# Patient Record
Sex: FEMALE | Race: WHITE | NOT HISPANIC OR LATINO | Employment: FULL TIME | URBAN - METROPOLITAN AREA
[De-identification: names, ages, dates, MRNs, and addresses within clinical notes are randomized per-mention and may not be internally consistent; named-entity substitution may affect disease eponyms.]

---

## 2023-03-14 LAB — LIPOPROTEIN A SER/PLAS: 54 MG/DL

## 2023-03-16 ENCOUNTER — TELEPHONE (OUTPATIENT)
Dept: PRIMARY CARE | Facility: CLINIC | Age: 63
End: 2023-03-16
Payer: COMMERCIAL

## 2023-08-10 ENCOUNTER — TELEPHONE (OUTPATIENT)
Dept: PRIMARY CARE | Facility: CLINIC | Age: 63
End: 2023-08-10
Payer: COMMERCIAL

## 2023-08-26 ENCOUNTER — HOSPITAL ENCOUNTER (OUTPATIENT)
Dept: DATA CONVERSION | Facility: HOSPITAL | Age: 63
Discharge: HOME | End: 2023-08-26

## 2023-08-26 DIAGNOSIS — S82.832A OTHER FRACTURE OF UPPER AND LOWER END OF LEFT FIBULA, INITIAL ENCOUNTER FOR CLOSED FRACTURE: ICD-10-CM

## 2023-08-26 DIAGNOSIS — S99.912A UNSPECIFIED INJURY OF LEFT ANKLE, INITIAL ENCOUNTER: ICD-10-CM
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
## 2023-08-26 DIAGNOSIS — S93.402A SPRAIN OF UNSPECIFIED LIGAMENT OF LEFT ANKLE, INITIAL ENCOUNTER: ICD-10-CM

## 2023-08-26 DIAGNOSIS — S99.922A UNSPECIFIED INJURY OF LEFT FOOT, INITIAL ENCOUNTER: ICD-10-CM

## 2023-10-09 PROBLEM — N60.09 BREAST CYST: Status: ACTIVE | Noted: 2023-10-09

## 2023-10-09 PROBLEM — E66.3 OVERWEIGHT (BMI 25.0-29.9): Status: ACTIVE | Noted: 2023-10-09

## 2023-10-09 PROBLEM — G93.5 CHIARI MALFORMATION TYPE I (MULTI): Status: ACTIVE | Noted: 2023-10-09

## 2023-10-09 PROBLEM — M65.319 ACQUIRED TRIGGER THUMB: Status: ACTIVE | Noted: 2023-10-09

## 2023-10-09 PROBLEM — E55.9 VITAMIN D DEFICIENCY: Status: ACTIVE | Noted: 2023-10-09

## 2023-10-09 PROBLEM — K58.1 IRRITABLE BOWEL SYNDROME WITH CONSTIPATION: Status: ACTIVE | Noted: 2023-10-09

## 2023-10-09 PROBLEM — N95.8 OTHER SPECIFIED MENOPAUSAL AND PERIMENOPAUSAL DISORDERS: Status: ACTIVE | Noted: 2023-10-09

## 2023-10-09 PROBLEM — N95.1 MENOPAUSAL SYMPTOMS: Status: ACTIVE | Noted: 2023-10-09

## 2023-10-09 PROBLEM — B36.9 OTOMYCOSIS OF RIGHT EAR: Status: ACTIVE | Noted: 2023-10-09

## 2023-10-09 PROBLEM — M25.572 ANKLE PAIN, LEFT: Status: ACTIVE | Noted: 2023-10-09

## 2023-10-09 PROBLEM — M16.11 PRIMARY OSTEOARTHRITIS OF RIGHT HIP: Status: ACTIVE | Noted: 2023-10-09

## 2023-10-09 PROBLEM — M26.69 TMJ CREPITUS: Status: ACTIVE | Noted: 2023-10-09

## 2023-10-09 PROBLEM — S82.839A FRACTURE OF FIBULA, DISTAL: Status: ACTIVE | Noted: 2023-10-09

## 2023-10-09 PROBLEM — H62.41 OTOMYCOSIS OF RIGHT EAR: Status: ACTIVE | Noted: 2023-10-09

## 2023-10-09 PROBLEM — H92.01 OTALGIA, RIGHT: Status: ACTIVE | Noted: 2023-10-09

## 2023-10-09 PROBLEM — H60.541 ECZEMA OF RIGHT EXTERNAL EAR: Status: ACTIVE | Noted: 2023-10-09

## 2023-10-09 PROBLEM — D72.819 LEUKOPENIA: Status: ACTIVE | Noted: 2023-10-09

## 2023-10-09 PROBLEM — M25.50 ARTHRALGIA: Status: ACTIVE | Noted: 2023-10-09

## 2023-10-09 PROBLEM — F41.9 ANXIETY: Status: ACTIVE | Noted: 2023-10-09

## 2023-10-09 PROBLEM — H93.8X1 FULLNESS IN EAR, RIGHT: Status: ACTIVE | Noted: 2023-10-09

## 2023-10-09 PROBLEM — E78.5 HYPERLIPIDEMIA: Status: ACTIVE | Noted: 2023-10-09

## 2023-10-09 RX ORDER — AMOXICILLIN 500 MG/1
2000 TABLET, FILM COATED ORAL
COMMUNITY
Start: 2023-05-09

## 2023-10-09 RX ORDER — ATORVASTATIN CALCIUM 10 MG/1
10 TABLET, FILM COATED ORAL NIGHTLY
COMMUNITY
End: 2024-02-28 | Stop reason: ALTCHOICE

## 2023-10-09 RX ORDER — CLOTRIMAZOLE 1 G/ML
SOLUTION TOPICAL 2 TIMES DAILY
COMMUNITY
Start: 2021-10-29

## 2023-10-09 RX ORDER — TRETINOIN 1 MG/G
CREAM TOPICAL NIGHTLY
COMMUNITY
Start: 2023-07-11 | End: 2024-02-28 | Stop reason: ALTCHOICE

## 2023-10-09 RX ORDER — SERTRALINE HYDROCHLORIDE 100 MG/1
1 TABLET, FILM COATED ORAL DAILY
COMMUNITY
Start: 2016-10-14 | End: 2023-12-26 | Stop reason: SDUPTHER

## 2023-10-09 RX ORDER — OXYCODONE AND ACETAMINOPHEN 5; 325 MG/1; MG/1
1 TABLET ORAL EVERY 4 HOURS PRN
COMMUNITY
Start: 2023-03-21 | End: 2024-02-28 | Stop reason: ALTCHOICE

## 2023-10-09 RX ORDER — PLECANATIDE 3 MG/1
3 TABLET ORAL DAILY
COMMUNITY
Start: 2023-03-06 | End: 2024-02-28 | Stop reason: ALTCHOICE

## 2023-10-09 RX ORDER — SERTRALINE HYDROCHLORIDE 25 MG/1
1 TABLET, FILM COATED ORAL DAILY
COMMUNITY
End: 2024-02-28 | Stop reason: ALTCHOICE

## 2023-10-09 RX ORDER — MOMETASONE FUROATE 1 MG/G
CREAM TOPICAL DAILY PRN
COMMUNITY
Start: 2022-12-05

## 2023-10-09 RX ORDER — VALACYCLOVIR HYDROCHLORIDE 500 MG/1
500 TABLET, FILM COATED ORAL 2 TIMES DAILY
COMMUNITY
Start: 2023-03-05 | End: 2024-02-28 | Stop reason: ALTCHOICE

## 2023-10-09 RX ORDER — FLUOXETINE 20 MG/1
1 TABLET ORAL DAILY
COMMUNITY
End: 2024-02-28 | Stop reason: ALTCHOICE

## 2023-10-11 ENCOUNTER — OFFICE VISIT (OUTPATIENT)
Dept: DERMATOLOGY | Facility: CLINIC | Age: 63
End: 2023-10-11
Payer: COMMERCIAL

## 2023-10-11 DIAGNOSIS — L57.8 DIFFUSE PHOTODAMAGE OF SKIN: ICD-10-CM

## 2023-10-11 DIAGNOSIS — L81.4 LENTIGO: ICD-10-CM

## 2023-10-11 DIAGNOSIS — L82.1 SEBORRHEIC KERATOSIS: ICD-10-CM

## 2023-10-11 DIAGNOSIS — L57.0 ACTINIC KERATOSIS: Primary | ICD-10-CM

## 2023-10-11 DIAGNOSIS — L71.9 ROSACEA: ICD-10-CM

## 2023-10-11 PROCEDURE — 17004 DESTROY PREMAL LESIONS 15/>: CPT | Performed by: DERMATOLOGY

## 2023-10-11 PROCEDURE — 1036F TOBACCO NON-USER: CPT | Performed by: DERMATOLOGY

## 2023-10-11 PROCEDURE — 99214 OFFICE O/P EST MOD 30 MIN: CPT | Performed by: DERMATOLOGY

## 2023-10-11 RX ORDER — MINOCYCLINE HYDROCHLORIDE 100 MG/1
100 CAPSULE ORAL DAILY
Qty: 30 CAPSULE | Refills: 2 | Status: SHIPPED | OUTPATIENT
Start: 2023-10-11 | End: 2024-01-09

## 2023-10-11 NOTE — PROGRESS NOTES
Spoke with mom, states pt has no other symptoms but may be teething.  Pt is happy and playful, eating, drinking and sleeping well.  Advised mom to monitor and give tylenol prn.  I will call again tomorrow to see how she is doing.  If symptoms worsen mom should call back.  Mom states understanding and agrees.   Subjective     Chiquis Howard is a 62 y.o. female who presents for the following: OTHER (Discuss recent biopsy result and treatment options.).  Biopsy of a suspicious lesion on her right lateral distal arm performed at her last visit in our office on 8/21/2023 revealed an actinic keratosis.    Today, she reports a sensation of grittiness or sand in her eyes over the past few weeks.    Review of Systems:  No other skin or systemic complaints other than what is documented elsewhere in the note.    The following portions of the chart were reviewed this encounter and updated as appropriate:       Skin Cancer History  - history of AKs  - rosacea  - no h/o skin cancer    Specialty Problems          Dermatology Problems    Otomycosis of right ear     Allergies:  Iodinated contrast media    Current Medications / CAM's:    Current Outpatient Medications:     amoxicillin (Amoxil) 500 mg tablet, Take 4 tablets (2,000 mg) by mouth. ONE HOUR PRIOR TO DENTAL PROCEDURE, Disp: , Rfl:     atorvastatin (Lipitor) 10 mg tablet, Take 1 tablet (10 mg) by mouth once daily at bedtime., Disp: , Rfl:     clotrimazole (Lotrimin) 1 % external solution, Apply topically 2 times a day., Disp: , Rfl:     FLUoxetine (PROzac) 20 mg tablet, Take 1 tablet (20 mg) by mouth once daily., Disp: , Rfl:     minocycline 100 mg capsule, Take 1 capsule (100 mg) by mouth once daily., Disp: 30 capsule, Rfl: 2    mometasone (Elocon) 0.1 % cream, Apply topically once daily as needed., Disp: , Rfl:     oxyCODONE-acetaminophen (Percocet) 5-325 mg tablet, Take 1 tablet by mouth every 4 hours if needed., Disp: , Rfl:     sertraline (Zoloft) 100 mg tablet, Take 1 tablet (100 mg) by mouth once daily., Disp: , Rfl:     sertraline (Zoloft) 25 mg tablet, Take 1 tablet (25 mg) by mouth once daily., Disp: , Rfl:     SITagliptin phosphate (Januvia) 25 mg tablet, Take 1 tablet (25 mg) by mouth once daily., Disp: , Rfl:     tretinoin (Retin-A) 0.1 % cream, once daily at  bedtime., Disp: , Rfl:     Trulance tablet tablet, Take 1 tablet (3 mg) by mouth once daily., Disp: , Rfl:     valACYclovir (Valtrex) 500 mg tablet, Take 1 tablet (500 mg) by mouth 2 times a day. DAY BEGIN THE DAY BEFORE SURGERY, Disp: , Rfl:      Objective   Well appearing patient in no apparent distress; mood and affect are within normal limits.    A skin examination was performed including: Face, neck, and bilateral upper extremities. All findings within normal limits unless otherwise noted below.    Assessment/Plan   1. Actinic keratosis (17)  Right Upper Arm - Anterior (17)  On the patient's right lateral distal arm, there is a pink, well-healed scar at the recent biopsy site with a surrounding rim of erythema, grittiness, and scale; scattered on the patient's bilateral arms and dorsal forearms, there are multiple erythematous, gritty, scaly macules    Biopsy-proven Actinic Keratosis and additional AKs -right lateral distal arm, present on the deep and peripheral margins, and scattered on bilateral arms and dorsal forearms, respectively.  The pre-cancerous nature of these lesions and treatment options were discussed with the patient today.  At this time, I recommend treatment with liquid nitrogen cryotherapy.  The patient expressed understanding, is in agreement with this plan, and wishes to proceed with cryotherapy today.    Destr of lesion - Right Upper Arm - Anterior  Complexity: simple    Destruction method: cryotherapy    Informed consent: discussed and consent obtained    Lesion destroyed using liquid nitrogen: Yes    Cryotherapy cycles:  1  Outcome: patient tolerated procedure well with no complications    Post-procedure details: wound care instructions given      2. Rosacea  Head - Anterior (Face)  There is moderate erythema of her bilateral lower eyelid margins consistent with marginal blepharitis on exam today.  On the patient's face, most prominent over the bilateral medial cheeks and nose, there is  moderate underlying erythema with several overlying telangiectasias and a few scattered erythematous, inflammatory papules     Rosacea - ocular type.  The chronic and intermittently flaring nature of this condition and treatment options were discussed extensively with the patient today.  After discussing the risks and benefits of various treatment options, particularly topical therapy and oral antibiotics, the patient expressed understanding, and, given the evidence of ocular involvement on exam today, the patient wishes to begin a course of oral antibiotic therapy.  Thus, I recommend a 3-month course of oral antibiotic therapy with Minocycline 100 mg PO once daily for its anti-inflammatory purposes.  I also discussed the various triggers of rosacea with the patient today, especially sun exposure, and emphasized the importance of trigger avoidance, particularly sun avoidance and sun protection with daily sunscreen use.  The risks, benefits, and side effects of this medication, including possible dizziness or headaches, were discussed; the patient was instructed to take the oral antibiotic with food and a glass of water.  I will have the patient return to my office in 3 months for re-evaluation, at which time we will hopefully transition to topical maintenance therapy.  The patient expressed understanding and is in agreement with this plan.    minocycline 100 mg capsule - Head - Anterior (Face)  Take 1 capsule (100 mg) by mouth once daily.    3. Seborrheic keratosis  Scattered on the patient's face, neck, and bilateral upper extremities, there are multiple tan- to light brown-colored, hyperkeratotic, stuck-on appearing papules of varying size and shape    Seborrheic Keratoses - scattered on face, neck, and bilateral upper extremities.  The benign nature of these lesions was discussed with the patient today and reassurance provided.  No treatment is medically indicated for these lesions at this time.    4.  Lentigo  Photodistributed  Multiple tan- to light brown-colored, round- to oval-shaped, symmetric and uniform-appearing macules and small patches consistent with lentigines scattered in sun-exposed areas.    Solar Lentigines and photodamage.  The clinically benign-appearing nature of these lesions and their relation to chronic sun exposure were discussed with the patient today and reassurance provided.  None of these lesions meet threshold for biopsy today, and thus no treatment is medically indicated for these lesions at this time.  The signs and symptoms of skin cancer were reviewed and the patient was advised to practice sun protection and sun avoidance, use daily sunscreen, and perform regular self skin exams.  The patient was instructed to monitor these lesions for any changes, such as in size, shape, or color, or associated symptoms and to call our office to schedule a return visit for re-evaluation if any such changes or symptoms are noticed in the future.  The patient expressed understanding and is in agreement with this plan.    5. Diffuse photodamage of skin  Photodistributed  Diffuse photodamage with actinic changes with telangiectasia and mottled pigmentation in sun-exposed areas.    Photodamage.  The signs and symptoms of skin cancer were reviewed and the patient was advised to practice sun protection and sun avoidance, use daily sunscreen, and perform regular self skin exams.  Sun protection was discussed, including avoiding the mid-day sun, wearing a sunscreen with SPF at least 50, and stressing the need for reapplication of sunscreen and applying more than they think they need.

## 2023-10-12 ENCOUNTER — APPOINTMENT (OUTPATIENT)
Dept: DERMATOLOGY | Facility: CLINIC | Age: 63
End: 2023-10-12
Payer: COMMERCIAL

## 2023-10-18 ENCOUNTER — APPOINTMENT (OUTPATIENT)
Dept: ORTHOPEDIC SURGERY | Facility: CLINIC | Age: 63
End: 2023-10-18
Payer: COMMERCIAL

## 2023-10-18 ENCOUNTER — ANCILLARY PROCEDURE (OUTPATIENT)
Dept: RADIOLOGY | Facility: CLINIC | Age: 63
End: 2023-10-18
Payer: COMMERCIAL

## 2023-10-18 ENCOUNTER — OFFICE VISIT (OUTPATIENT)
Dept: ORTHOPEDIC SURGERY | Facility: CLINIC | Age: 63
End: 2023-10-18
Payer: COMMERCIAL

## 2023-10-18 DIAGNOSIS — S82.839A NONDISPLACED FRACTURE OF DISTAL END OF FIBULA: ICD-10-CM

## 2023-10-18 PROCEDURE — 99213 OFFICE O/P EST LOW 20 MIN: CPT | Performed by: PHYSICIAN ASSISTANT

## 2023-10-18 PROCEDURE — 1036F TOBACCO NON-USER: CPT | Performed by: PHYSICIAN ASSISTANT

## 2023-10-18 PROCEDURE — 73610 X-RAY EXAM OF ANKLE: CPT | Mod: LEFT SIDE | Performed by: RADIOLOGY

## 2023-10-18 PROCEDURE — 73610 X-RAY EXAM OF ANKLE: CPT | Mod: LT,FY

## 2023-10-18 NOTE — PROGRESS NOTES
Chief Complaint    left ankle fracture      History of Present [id number] female presents to ortho injury clinic with left fibula fracture. Patient was on a walking tour in [place] when another person fell into her and caused her to fall & invert her ankle. She experienced immediate pain. She was evaluated at a hospital in [facility] and given a soft ankle brace with instruction that she was okay for full weight bearing, per patient. She just returned from her trip this morning 8/7/23 and decided to come to the injury clinic for a 2nd opinion regarding best treatment.    Reports mild pain with weight bearing. Denies numbness/tingling in the extremity. Denies weakness. Has been ambulating a lot on it with the soft brace and using crutches prn.    On 8/23/23; patient reports she is doing well. WBAT in the boot. No pain    On 9/15/23; patient reports she is doing well. She did go to [facility] and was doing a lot of walking. She did take off the boot and walk at home. She has been getting vitamin D. No pain    On 9/29/23; patient reports she is doing well. WBAT in her boot. No pain. Doing some HEP.     On 10/18/23; patient reports she is doing well. WBAT in tennis shoes. No pain. Doing HEP.      On examination of the left ankle/foot:  Normal gait in the boot  Normal alignment  Minimal swelling; No ecchymosis or erythema. Skin intact; no ulcers or lesions.   Normal ROM in ankle plantarflexion, dorsiflexion, inversion and eversion; normal ROM in 2-5th toe flexion/extension and 1st MTP flexion/extension  Normal strength in ankle plantarflexion, dorsiflexion, inversion and eversion; normal strength with great toe flexion/extension  Tenderness to palpation: none lateral malleolus  No tenderness to palpation over the Achilles, medial malleolus, posterior tibial tendon, peroneal tendon, ATFL, deltoid ligament, talus or navicular.  no tenderness to palpation over heel, plantar arch, base of 1st metatarsal/2nd metatarsal,  sesamoids, 5th metatarsal, medial cuneiform, navicular, 1st MTP joint or 2nd or 3rd intermetarsal space.  Neurovascularly intact. Good capillary refill. No sensory deficit to light touch. Normal proprioception. Dorsalis pedis and posterior tibial pulses 2+ bilaterally.   - Moreno’s test  - Dorsiflexion-eversion test    I personally reviewed the patient's x-ray images and reports of the left ankle. The xrays show a minimally displaced fracture of the lateral malleolus with increased callus formation. no other fractures or dislocation. Normal joint spacing    ASSESSMENT: left ankle distal fibula Wu A fracture, healing    PLAN: Treatment options were discussed with the patient. The patient was given a prescription for physical therapy. Physical therapy is medically necessary to improve strength, balance, range of motion and functional outcomes after injury and/or surgery. Patient was given a handout and instructed on an at home stretching program. They should do these exercises 3 times per day for 6 weeks and then daily. Patient can use OTC aspercream for pain and continue to ice and elevate supported at the calf to reduce swelling. All the patient's questions were answered. The patient agrees with the above plan. Follow up in 4-6 weeks with repeat left ankle xrays with AP, lateral and oblique views to evaluate bone healing.

## 2023-10-19 ENCOUNTER — APPOINTMENT (OUTPATIENT)
Dept: ORTHOPEDIC SURGERY | Facility: CLINIC | Age: 63
End: 2023-10-19
Payer: COMMERCIAL

## 2023-12-07 ENCOUNTER — APPOINTMENT (OUTPATIENT)
Dept: OTOLARYNGOLOGY | Facility: CLINIC | Age: 63
End: 2023-12-07
Payer: COMMERCIAL

## 2023-12-14 ENCOUNTER — OFFICE VISIT (OUTPATIENT)
Dept: OTOLARYNGOLOGY | Facility: CLINIC | Age: 63
End: 2023-12-14
Payer: COMMERCIAL

## 2023-12-14 VITALS — BODY MASS INDEX: 29.51 KG/M2 | WEIGHT: 188 LBS | TEMPERATURE: 96.9 F | HEIGHT: 67 IN

## 2023-12-14 DIAGNOSIS — H61.21 IMPACTED CERUMEN OF RIGHT EAR: Primary | ICD-10-CM

## 2023-12-14 DIAGNOSIS — L29.9 ITCHING OF EAR: ICD-10-CM

## 2023-12-14 PROCEDURE — 1036F TOBACCO NON-USER: CPT | Performed by: NURSE PRACTITIONER

## 2023-12-14 PROCEDURE — 99213 OFFICE O/P EST LOW 20 MIN: CPT | Performed by: NURSE PRACTITIONER

## 2023-12-14 RX ORDER — METRONIDAZOLE 7.5 MG/G
CREAM TOPICAL
COMMUNITY
Start: 2023-08-21

## 2023-12-14 ASSESSMENT — PATIENT HEALTH QUESTIONNAIRE - PHQ9
SUM OF ALL RESPONSES TO PHQ9 QUESTIONS 1 AND 2: 0
2. FEELING DOWN, DEPRESSED OR HOPELESS: NOT AT ALL
1. LITTLE INTEREST OR PLEASURE IN DOING THINGS: NOT AT ALL

## 2023-12-14 NOTE — PROGRESS NOTES
Subjective   Patient ID: Chiquis Howard is a 62 y.o. female who presents for Cerumen Impaction and Follow-up.  HPI  Patient presents for 6-month follow-up visit.  She has a history of right otomycosis and right eczema.  She also had radiation on that side.  Today, she reports intermittent significant itching to the right EAC.  She uses oil-based drops intermittently.  Today, she denies any otalgia, otorrhea, hearing loss.  Review of Systems  A comprehensive or 10 points review of the patient´s constitutional, neurological, HEENT, pulmonary, cardiovascular and genito-urinary systems showed only those mentioned in history of present illness.    Objective   Physical Exam  Constitutional: no fever, chills, weight loss or weight gain   General appearance: Appears well, well-nourished, well groomed. No acute distress.   Communication: Normal communication   Psychiatric: Oriented to person, place and time. Normal mood and affect.   Neurologic: Cranial nerves II-XII grossly intact and symmetric bilaterally.   Head and Face:   Head: Atraumatic with no masses, lesions or scarring.   Face: Normal symmetry, no paralysis, synkinesis or facial tic. No scars or deformities.     Eyes: Conjunctiva not edematous or erythematous   Ears: External inspection of ears with no deformity, scars or masses.  Right canal with cerumen impaction.  Left canal clear.  TM intact.  No effusion or retraction noted.     Neck: Normal appearing, symmetric, trachea midline.   Cardiovascular: Examination of peripheral vascular system shows no clubbing or cyanosis.   Respiratory: No respiratory distress increased work of breathing. Inspection of the chest with symmetric chest expansion and normal respiratory effort.   Skin: No rashes in the head or neck    Assessment/Plan        This patient presents for subsequent evaluation of acute acquired right-sided cerumen impaction and chronic right EAC itching.    Reassurance given that exam looks good after  cleaning.  Skin of right ear canal is still slightly irritated, but much improved than when she was initially seeing me.  I recommended she continue using the oil-based drops and return in 6 months for cleaning.  All questions were answered to patient's satisfaction.    This note was created using speech recognition transcription software. Despite proofreading, several typographical errors might be present that might affect the meaning of the content. Please call with any questions.    Patient ID: Chiquis Howard is a 62 y.o. female.    Ear cerumen removal    Date/Time: 12/14/2023 2:25 PM    Performed by: DANYA Arana  Authorized by: DANYA Arana    Consent:     Consent obtained:  Verbal    Consent given by:  Patient    Risks discussed:  Pain    Alternatives discussed:  No treatment  Procedure details:     Location:  R ear    Procedure type comment:  Suction    Procedure outcomes: cerumen removed    Post-procedure details:     Inspection:  No bleeding, ear canal clear and TM intact    Hearing quality:  Normal    Procedure completion:  Tolerated well, no immediate complications  Comments:      Skin of right EAC is slightly erythematous and peeling.  No recurrence of otomycosis.      DANYA Arana 12/14/23 2:23 PM

## 2023-12-26 DIAGNOSIS — F41.9 ANXIETY: Primary | ICD-10-CM

## 2023-12-26 RX ORDER — SERTRALINE HYDROCHLORIDE 100 MG/1
100 TABLET, FILM COATED ORAL DAILY
Qty: 30 TABLET | Refills: 2 | Status: SHIPPED | OUTPATIENT
Start: 2023-12-26 | End: 2024-02-28 | Stop reason: SDUPTHER

## 2024-01-29 ENCOUNTER — OFFICE VISIT (OUTPATIENT)
Dept: DERMATOLOGY | Facility: CLINIC | Age: 64
End: 2024-01-29
Payer: COMMERCIAL

## 2024-01-29 DIAGNOSIS — L71.9 ROSACEA: Primary | ICD-10-CM

## 2024-01-29 DIAGNOSIS — L57.8 DIFFUSE PHOTODAMAGE OF SKIN: ICD-10-CM

## 2024-01-29 PROCEDURE — 1036F TOBACCO NON-USER: CPT | Performed by: DERMATOLOGY

## 2024-01-29 PROCEDURE — 99214 OFFICE O/P EST MOD 30 MIN: CPT | Performed by: DERMATOLOGY

## 2024-01-29 RX ORDER — DOXYCYCLINE 100 MG/1
100 CAPSULE ORAL DAILY
Qty: 30 CAPSULE | Refills: 2 | Status: SHIPPED | OUTPATIENT
Start: 2024-01-29 | End: 2024-04-28

## 2024-01-29 ASSESSMENT — DERMATOLOGY PATIENT ASSESSMENT
ARE YOU ON BIRTH CONTROL: NO
ARE YOU TRYING TO GET PREGNANT: NO
DO YOU HAVE ANY NEW OR CHANGING LESIONS: NO
DO YOU HAVE IRREGULAR MENSTRUAL CYCLES: NO
DO YOU USE SUNSCREEN: OCCASIONALLY
ARE YOU AN ORGAN TRANSPLANT RECIPIENT: NO
DO YOU USE A TANNING BED: NO

## 2024-01-29 ASSESSMENT — DERMATOLOGY QUALITY OF LIFE (QOL) ASSESSMENT: ARE THERE EXCLUSIONS OR EXCEPTIONS FOR THE QUALITY OF LIFE ASSESSMENT: NO

## 2024-01-29 ASSESSMENT — ITCH NUMERIC RATING SCALE: HOW SEVERE IS YOUR ITCHING?: 0

## 2024-01-29 NOTE — PROGRESS NOTES
Subjective     Chiquis Howard is a 63 y.o. female who presents for the following: Rosacea.  She was last seen in our office on 10/11/23, at which time she was prescribed a 3-month course of oral antibiotic therapy with Minocycline 100 mg PO once daily for ocular rosacea.    Today, the patient states she took Minocycline for 1 month, but she was experiencing GI upset and diarrhea, so she then took a 1 week break and then took it for another 3 weeks and then stopped because of diarrhea.  Today, the patient states her ocular symptoms improved with minocycline, but did not resolve, and she states she still feels like there is sand or glass in her eyes.  Her last dose was about 1.5 months ago.      Review of Systems:  No other skin or systemic complaints other than what is documented elsewhere in the note.    The following portions of the chart were reviewed this encounter and updated as appropriate:       Skin Cancer History  No skin cancer on file.    Specialty Problems          Dermatology Problems    Otomycosis of right ear       Past Dermatologic / Past Relevant Medical History:    - history of AKs  - rosacea, including ocular rosacea  - no h/o skin cancer    Family History:    No family history of melanoma or skin cancer    Social History:    The patient states she went to [facility] and then [facility] for her [employer] and now works in [employer] management for [employer] and has 3 sons ages 21, 23, and 24 years old     Allergies:  Iodinated contrast media    Current Medications / CAM's:    Current Outpatient Medications:     amoxicillin (Amoxil) 500 mg tablet, Take 4 tablets (2,000 mg) by mouth. ONE HOUR PRIOR TO DENTAL PROCEDURE, Disp: , Rfl:     atorvastatin (Lipitor) 10 mg tablet, Take 1 tablet (10 mg) by mouth once daily at bedtime., Disp: , Rfl:     clotrimazole (Lotrimin) 1 % external solution, Apply topically 2 times a day., Disp: , Rfl:     FLUoxetine (PROzac) 20 mg tablet,  Take 1 tablet (20 mg) by mouth once daily., Disp: , Rfl:     metroNIDAZOLE (Metrocream) 0.75 % cream, 1 APPLICATION TWICE A DAY TO AFFECTED AREA OF FACE, Disp: , Rfl:     mometasone (Elocon) 0.1 % cream, Apply topically once daily as needed., Disp: , Rfl:     oxyCODONE-acetaminophen (Percocet) 5-325 mg tablet, Take 1 tablet by mouth every 4 hours if needed., Disp: , Rfl:     sertraline (Zoloft) 100 mg tablet, Take 1 tablet (100 mg) by mouth once daily., Disp: 30 tablet, Rfl: 2    sertraline (Zoloft) 25 mg tablet, Take 1 tablet (25 mg) by mouth once daily., Disp: , Rfl:     SITagliptin phosphate (Januvia) 25 mg tablet, Take 1 tablet (25 mg) by mouth once daily., Disp: , Rfl:     tretinoin (Retin-A) 0.1 % cream, once daily at bedtime., Disp: , Rfl:     Trulance tablet tablet, Take 1 tablet (3 mg) by mouth once daily., Disp: , Rfl:     valACYclovir (Valtrex) 500 mg tablet, Take 1 tablet (500 mg) by mouth 2 times a day. DAY BEGIN THE DAY BEFORE SURGERY, Disp: , Rfl:     doxycycline (Vibramycin) 100 mg capsule, Take 1 capsule (100 mg) by mouth once daily. Take with at least 8 ounces (large glass) of water, do not lie down for 30 minutes after, Disp: 30 capsule, Rfl: 2     Objective   Well appearing patient in no apparent distress; mood and affect are within normal limits.    A skin examination was performed including the face and neck. All findings within normal limits unless otherwise noted below.    Assessment/Plan   1. Rosacea  Right Eye  There is mild-moderate erythema of her bilateral lower eyelid margins consistent with marginal blepharitis on exam today.  On the patient's face, most prominent over the bilateral medial cheeks and nose, there is moderate underlying erythema with several overlying telangiectasias and a few scattered erythematous, inflammatory papules.    Rosacea - recurrent flare; ocular type.  The chronic and intermittently flaring nature of this condition and treatment options were discussed  extensively with the patient again today.  After discussing the risks and benefits of various treatment options, particularly topical therapy and oral antibiotics, the patient expressed understanding, and, given the evidence of ocular involvement on exam today, she wishes to begin another course of oral antibiotic therapy.  Thus, given her GI side effects with Minocycline, we recommend switching her oral antibiotic therapy to a 3-month course of Doxycycline 100 mg PO once daily for its anti-inflammatory purposes.  We also discussed the various triggers of rosacea with the patient today, especially sun exposure, and emphasized the importance of trigger avoidance, particularly sun avoidance and sun protection with daily sunscreen use.  The risks, benefits, and side effects of this medication, including possible GI upset and photosensitivity, were discussed; the patient was instructed to take the oral antibiotic with food and a glass of water and to make sure to practice sun avoidance and sun protection with daily sunscreen use while on doxycycline.  We will have the patient return to our office in 3 months for re-evaluation, at which time we will hopefully transition to topical maintenance therapy.  Sheexpressed understanding and is in agreement with this plan.    Related Medications  doxycycline (Vibramycin) 100 mg capsule  Take 1 capsule (100 mg) by mouth once daily. Take with at least 8 ounces (large glass) of water, do not lie down for 30 minutes after    2. Diffuse photodamage of skin  Diffuse photodamage with actinic changes with telangiectasia and mottled pigmentation in sun-exposed areas.    Photodamage.  The signs and symptoms of skin cancer were reviewed and the patient was advised to practice sun protection and sun avoidance, use daily sunscreen, and perform regular self skin exams.  Sun protection was discussed, including avoiding the mid-day sun, wearing a sunscreen with SPF at least 50, and stressing the  need for reapplication of sunscreen and applying more than they think they need.         Elie Casillas, DO      I saw and evaluated the patient. I personally obtained the key and critical portions of the history and physical exam or was physically present for key and critical portions performed by the resident/fellow. I reviewed the resident/fellow's documentation and discussed the patient with the resident/fellow. I agree with the resident/fellow's medical decision making as documented in the note.    Skyler Sifuentes MD

## 2024-02-06 ENCOUNTER — OFFICE VISIT (OUTPATIENT)
Dept: OTOLARYNGOLOGY | Facility: CLINIC | Age: 64
End: 2024-02-06
Payer: COMMERCIAL

## 2024-02-06 ENCOUNTER — CLINICAL SUPPORT (OUTPATIENT)
Dept: AUDIOLOGY | Facility: CLINIC | Age: 64
End: 2024-02-06
Payer: COMMERCIAL

## 2024-02-06 VITALS — BODY MASS INDEX: 28.82 KG/M2 | TEMPERATURE: 96.4 F | WEIGHT: 183.6 LBS | HEIGHT: 67 IN

## 2024-02-06 DIAGNOSIS — H69.92 DYSFUNCTION OF LEFT EUSTACHIAN TUBE: ICD-10-CM

## 2024-02-06 DIAGNOSIS — H90.42 SENSORINEURAL HEARING LOSS (SNHL) OF LEFT EAR WITH UNRESTRICTED HEARING OF RIGHT EAR: ICD-10-CM

## 2024-02-06 DIAGNOSIS — H93.12 TINNITUS OF LEFT EAR: ICD-10-CM

## 2024-02-06 DIAGNOSIS — H90.42 SENSORINEURAL HEARING LOSS (SNHL) OF LEFT EAR WITH UNRESTRICTED HEARING OF RIGHT EAR: Primary | ICD-10-CM

## 2024-02-06 DIAGNOSIS — H61.23 BILATERAL IMPACTED CERUMEN: Primary | ICD-10-CM

## 2024-02-06 PROCEDURE — 69210 REMOVE IMPACTED EAR WAX UNI: CPT | Performed by: NURSE PRACTITIONER

## 2024-02-06 PROCEDURE — 92557 COMPREHENSIVE HEARING TEST: CPT | Performed by: AUDIOLOGIST

## 2024-02-06 PROCEDURE — 92550 TYMPANOMETRY & REFLEX THRESH: CPT | Performed by: AUDIOLOGIST

## 2024-02-06 PROCEDURE — 99214 OFFICE O/P EST MOD 30 MIN: CPT | Performed by: NURSE PRACTITIONER

## 2024-02-06 PROCEDURE — 1036F TOBACCO NON-USER: CPT | Performed by: NURSE PRACTITIONER

## 2024-02-06 RX ORDER — AZELASTINE 1 MG/ML
2 SPRAY, METERED NASAL 2 TIMES DAILY
Qty: 30 ML | Refills: 11 | Status: SHIPPED | OUTPATIENT
Start: 2024-02-06 | End: 2025-02-05

## 2024-02-06 RX ORDER — FLUTICASONE PROPIONATE 50 MCG
1 SPRAY, SUSPENSION (ML) NASAL 2 TIMES DAILY
Qty: 16 G | Refills: 11 | Status: SHIPPED | OUTPATIENT
Start: 2024-02-06 | End: 2025-02-05

## 2024-02-06 ASSESSMENT — PATIENT HEALTH QUESTIONNAIRE - PHQ9
SUM OF ALL RESPONSES TO PHQ9 QUESTIONS 1 AND 2: 0
1. LITTLE INTEREST OR PLEASURE IN DOING THINGS: NOT AT ALL
2. FEELING DOWN, DEPRESSED OR HOPELESS: NOT AT ALL

## 2024-02-06 NOTE — PROGRESS NOTES
Subjective   Patient ID: Chiquis Howard is a 63 y.o. female who presents for Ear Problem (Crackling in left ear).  HPI  This patient presents for a follow-up visit.  In review, she has seen me in the past for right otomycosis which resolved.  She reports recent positionally triggered vertigo which resolved with home Epley maneuver.  Her main complaint today is frequent crackling in the left ear.  This has been fairly constant for the past 3 weeks.  It is worse when lying on her left side.  She denies any otalgia, otorrhea but endorses decreased hearing on the left side.  She is unsure if the decreased hearing began before or after the crackling.  She endorses frequent nasal congestion and has used alternating Flonase and saline spray.  Review of Systems  A comprehensive or 10 points review of the patient´s constitutional, neurological, HEENT, pulmonary, cardiovascular and genito-urinary systems showed only those mentioned in history of present illness.    Objective   Physical Exam  Constitutional: no fever, chills, weight loss or weight gain   General appearance: Appears well, well-nourished, well groomed. No acute distress.   Communication: Normal communication   Psychiatric: Oriented to person, place and time. Normal mood and affect.   Neurologic: Cranial nerves II-XII grossly intact and symmetric bilaterally.   Head and Face:   Head: Atraumatic with no masses, lesions or scarring.   Face: Normal symmetry, no paralysis, synkinesis or facial tic. No scars or deformities.   TMJ: Bilateral crepitus  Eyes: Conjunctiva not edematous or erythematous   Ears: External inspection of ears with no deformity, scars or masses. Bilateral EACs with cerumen impactions.  Neck: Normal appearing, symmetric, trachea midline.   Cardiovascular: Examination of peripheral vascular system shows no clubbing or cyanosis.   Respiratory: No respiratory distress increased work of breathing. Inspection of the chest with symmetric chest expansion  and normal respiratory effort.   Skin: No rashes in the head or neck  My interpretation of the audiogram done today is right-sided with normal hearing, excellent word recognition score and normal tympanogram.  Left side with normal hearing through 750 Hz dipping to very mild sensorineural hearing loss with asymmetry at 1000 through 4000 Hz with excellent word recognition score and normal tympanogram.  She has not had any previous audiograms to compare this to.  Assessment/Plan        This patient presents for subsequent evaluation of acute acquired bilateral cerumen impaction,  left sensorineural hearing loss, and left eustachian tube dysfunction.    Reassurance given that otologic exam is normal under the microscope after cleaning.  Initially, she felt there was some relief of left crackling after cerumen removed, but within a few minutes it started again.  We discussed that her description of crackling is consistent with eustachian tube dysfunction, but I do not visualize fluid behind the left TM.  There could be fluid in her left mastoid.  I recommended Flonase and azelastine.  She was instructed on proper technique.  Given the sensorineural asymmetry, I also recommended MRI IAC with and without contrast to further evaluate.  I am happy to discuss results on the phone.  Since she is uncertain if the left hearing was decreased prior to the onset of crackling, I did not recommend high-dose prednisone.  Patient is in agreement with the plan.  All questions were answered to patient's satisfaction.      30 minutes was spent on this patient´s visit. More than 50% of that time was spent in counseling regarding the possible etiologies, test results, treatment options and coordinating care.    This note was created using speech recognition transcription software. Despite proofreading, several typographical errors might be present that might affect the meaning of the content. Please call with any questions.  Patient ID:  Chiquis Howard is a 63 y.o. female.    Ear cerumen removal    Date/Time: 2/6/2024 9:45 AM    Performed by: DANYA Arana  Authorized by: DANYA Arana    Consent:     Consent obtained:  Verbal    Consent given by:  Patient    Risks discussed:  Pain    Alternatives discussed:  No treatment  Procedure details:     Location:  L ear and R ear    Procedure type: curette      Procedure type comment:  Suction    Procedure outcomes: cerumen removed    Post-procedure details:     Inspection:  No bleeding, ear canal clear and TM intact    Hearing quality:  Normal    Procedure completion:  Tolerated well, no immediate complications  Comments:      On both sides, there was cerumen adherent to the TMs with stray hairs.  These were all easily removed.      DANYA Arana 02/06/24 9:41 AM

## 2024-02-06 NOTE — PROGRESS NOTES
AUDIOMETRIC EVALUATION    Name: Chiquis Howard  : 1960  Age: 63 y.o.    Date of Evaluation: 2024  Time: 8:30-9:00    HISTORY     Chiquis Howard is seen today for an audiometric evaluation in conjunction with ENT. Today, Ms. Howadr reports intermittent left-sided tinnitus, described as crackling. Symptoms began 3 weeks ago. Tinnitus is louder at night while laying on her left side. She reports her right ear is her better hearing ear. She reports difficulty understanding her children at home. She reports vertiginous/spinning dizziness beginning a few weeks ago. She reports recent medication change around the time of dizziness onset. She reports dizziness episodes are provoked by bending over and rolling over in bed. She reports a familial history of adult onset hearing loss of unknown etiology. Denies recent ear infections, otalgia, otorrhea, ear pressure, and a history of noise exposure.     IMPRESSIONS AND RECOMMENDATIONS      Discussed results and recommendations with Ms. Howard. Today's testing revealed normal hearing sensitivity in the right ear and normal hearing sloping to a mild sensorineural hearing loss, rising back to normal hearing in the left ear. Patient was recommended to follow up with ENT to pursue medical evaluation for their symptoms before seeking audiologic intervention for their hearing loss. It was recommended that another hearing test may be warranted following medical treatment. Encouraged Ms. Howard to use good communication strategies with her children, including face-to-face conversation, reducing background noise and distance from desired source, utilizing visual cues/gestures, increasing light to assist with visual cues, use of clear speech. Questions were addressed and the patient was encouraged to contact our department ([phone]) should questions or concerns arise.    TREATMENT PLAN     Follow up with ENT as scheduled.  Retest hearing in conjunction with medical care  or sooner if concerns arise.  Wear hearing protection while in the presence of loud sounds.  Follow up with medical providers as indicated.    PROCEDURE     OTOSCOPY - Physical exam to evaluate the outer ear.  Right Ear: Clear ear canal with visible tympanic membrane.  Left Ear: Clear ear canal with visible tympanic membrane.    IMMITTANCE AUDIOMETRY - Assesses the function of the middle and inner ear structures.  Right Ear: Tympanometry revealed normal ear canal volume, peak pressure, and compliance, consistent with normal middle ear function (Type A). Ipsilateral Acoustic Reflexes were present 500-1000 Hz.   Left Ear: Tympanometry revealed normal ear canal volume, peak pressure, and compliance, consistent with normal middle ear function (Type A). Ipsilateral Acoustic Reflexes were present at 500 Hz.     DISTORTION-PRODUCT OTOACOUSTIC EMISSIONS (DPOAEs) - Assesses cochlear outer hair cell function.  Right Ear: Present 4979-4786, 8000 Hz. Absent 5874-6614 Hz.  Left Ear: Present 3113-8718 Hz. Absent 2000, 1387-6620 Hz.    PURE TONE AUDIOMETRY - Conventional Audiometry via TDH headphones with good reliability.  Right Ear: Normal hearing sensitivity 125-8000 Hz.  Left Ear: Normal hearing sensitivity 125-500 Hz sloping to a mild sensorineural hearing loss 4628-3239 Hz, rising back to normal hearing sensitivity at 8000 Hz.    SPEECH AUDIOMETRY  Right Ear: Speech recognition threshold in agreement with pure tone average. Word Recognition Score was 100% at 65 dB HL, based on an NU-6 recorded ordered by difficulty 10-word list.   Left Ear: Speech recognition threshold in agreement with pure tone average. Word Recognition Score was 100% at 70 dB HL, based on an NU-6 recorded ordered by difficulty 10-word list.       Completed by:    FAREED Pedro.  Doctor of Audiology Extern     Gypsy Diaz, [facility]  Licensed Audiologist    AUDIOGRAM

## 2024-02-08 ENCOUNTER — TELEPHONE (OUTPATIENT)
Dept: PRIMARY CARE | Facility: CLINIC | Age: 64
End: 2024-02-08
Payer: COMMERCIAL

## 2024-02-08 DIAGNOSIS — Z00.00 ROUTINE GENERAL MEDICAL EXAMINATION AT A HEALTH CARE FACILITY: Primary | ICD-10-CM

## 2024-02-08 DIAGNOSIS — E55.9 VITAMIN D DEFICIENCY: ICD-10-CM

## 2024-02-19 ENCOUNTER — APPOINTMENT (OUTPATIENT)
Dept: DERMATOLOGY | Facility: CLINIC | Age: 64
End: 2024-02-19
Payer: COMMERCIAL

## 2024-02-21 ENCOUNTER — LAB (OUTPATIENT)
Dept: LAB | Facility: LAB | Age: 64
End: 2024-02-21
Payer: COMMERCIAL

## 2024-02-21 DIAGNOSIS — Z00.00 ROUTINE GENERAL MEDICAL EXAMINATION AT A HEALTH CARE FACILITY: ICD-10-CM

## 2024-02-21 DIAGNOSIS — E55.9 VITAMIN D DEFICIENCY: ICD-10-CM

## 2024-02-21 LAB
25(OH)D3 SERPL-MCNC: 80 NG/ML (ref 30–100)
ALBUMIN SERPL BCP-MCNC: 4.5 G/DL (ref 3.4–5)
ALP SERPL-CCNC: 64 U/L (ref 33–136)
ALT SERPL W P-5'-P-CCNC: 17 U/L (ref 7–45)
ANION GAP SERPL CALC-SCNC: 12 MMOL/L (ref 10–20)
AST SERPL W P-5'-P-CCNC: 15 U/L (ref 9–39)
BILIRUB SERPL-MCNC: 0.4 MG/DL (ref 0–1.2)
BUN SERPL-MCNC: 16 MG/DL (ref 6–23)
CALCIUM SERPL-MCNC: 9.9 MG/DL (ref 8.6–10.6)
CHLORIDE SERPL-SCNC: 105 MMOL/L (ref 98–107)
CHOLEST SERPL-MCNC: 262 MG/DL (ref 0–199)
CHOLESTEROL/HDL RATIO: 4.9
CO2 SERPL-SCNC: 30 MMOL/L (ref 21–32)
CREAT SERPL-MCNC: 0.72 MG/DL (ref 0.5–1.05)
EGFRCR SERPLBLD CKD-EPI 2021: >90 ML/MIN/1.73M*2
ERYTHROCYTE [DISTWIDTH] IN BLOOD BY AUTOMATED COUNT: 12.7 % (ref 11.5–14.5)
EST. AVERAGE GLUCOSE BLD GHB EST-MCNC: 103 MG/DL
GLUCOSE SERPL-MCNC: 89 MG/DL (ref 74–99)
HBA1C MFR BLD: 5.2 %
HCT VFR BLD AUTO: 45.3 % (ref 36–46)
HDLC SERPL-MCNC: 53.3 MG/DL
HGB BLD-MCNC: 14.7 G/DL (ref 12–16)
LDLC SERPL CALC-MCNC: 182 MG/DL
MCH RBC QN AUTO: 29.1 PG (ref 26–34)
MCHC RBC AUTO-ENTMCNC: 32.5 G/DL (ref 32–36)
MCV RBC AUTO: 90 FL (ref 80–100)
NON HDL CHOLESTEROL: 209 MG/DL (ref 0–149)
NRBC BLD-RTO: 0 /100 WBCS (ref 0–0)
PLATELET # BLD AUTO: 181 X10*3/UL (ref 150–450)
POTASSIUM SERPL-SCNC: 4.3 MMOL/L (ref 3.5–5.3)
PROT SERPL-MCNC: 7.1 G/DL (ref 6.4–8.2)
RBC # BLD AUTO: 5.06 X10*6/UL (ref 4–5.2)
SODIUM SERPL-SCNC: 143 MMOL/L (ref 136–145)
TRIGL SERPL-MCNC: 133 MG/DL (ref 0–149)
TSH SERPL-ACNC: 2.43 MIU/L (ref 0.44–3.98)
VIT B12 SERPL-MCNC: 579 PG/ML (ref 211–911)
VLDL: 27 MG/DL (ref 0–40)
WBC # BLD AUTO: 4.2 X10*3/UL (ref 4.4–11.3)

## 2024-02-21 PROCEDURE — 85027 COMPLETE CBC AUTOMATED: CPT

## 2024-02-21 PROCEDURE — 82306 VITAMIN D 25 HYDROXY: CPT

## 2024-02-21 PROCEDURE — 84443 ASSAY THYROID STIM HORMONE: CPT

## 2024-02-21 PROCEDURE — 80061 LIPID PANEL: CPT

## 2024-02-21 PROCEDURE — 80053 COMPREHEN METABOLIC PANEL: CPT

## 2024-02-21 PROCEDURE — 36415 COLL VENOUS BLD VENIPUNCTURE: CPT

## 2024-02-21 PROCEDURE — 82607 VITAMIN B-12: CPT

## 2024-02-21 PROCEDURE — 83036 HEMOGLOBIN GLYCOSYLATED A1C: CPT

## 2024-02-27 ENCOUNTER — HOSPITAL ENCOUNTER (OUTPATIENT)
Dept: RADIOLOGY | Facility: CLINIC | Age: 64
Discharge: HOME | End: 2024-02-27
Payer: COMMERCIAL

## 2024-02-27 DIAGNOSIS — H90.42 SENSORINEURAL HEARING LOSS (SNHL) OF LEFT EAR WITH UNRESTRICTED HEARING OF RIGHT EAR: ICD-10-CM

## 2024-02-27 PROCEDURE — 70553 MRI BRAIN STEM W/O & W/DYE: CPT | Performed by: RADIOLOGY

## 2024-02-27 PROCEDURE — 2550000001 HC RX 255 CONTRASTS: Performed by: NURSE PRACTITIONER

## 2024-02-27 PROCEDURE — 70553 MRI BRAIN STEM W/O & W/DYE: CPT

## 2024-02-27 PROCEDURE — A9575 INJ GADOTERATE MEGLUMI 0.1ML: HCPCS | Performed by: NURSE PRACTITIONER

## 2024-02-27 RX ORDER — GADOTERATE MEGLUMINE 376.9 MG/ML
17 INJECTION INTRAVENOUS
Status: COMPLETED | OUTPATIENT
Start: 2024-02-27 | End: 2024-02-27

## 2024-02-27 RX ADMIN — GADOTERATE MEGLUMINE 17 ML: 376.9 INJECTION INTRAVENOUS at 09:21

## 2024-02-27 NOTE — PROGRESS NOTES
"Subjective   Patient ID: Chiquis Howard is a 63 y.o. female who presents for Annual Exam.    Last Annual Physical: Feb 2023   Last Dental Visit: Up-to-date   Last Eye exam: Within the year   Hearing Concerns: Yes       HPI   The patient reports that she is taking Zoloft for her anxiety. She states that she often experiences mood changes and inquired about increasing the dose of Zoloft. Her blood work showed elevated LDL and mentions that she has been consuming a plant-based diet. She complains of hives and adds that her GI issues have improved since she started consuming less dairy. She is experience lower back and thigh pain.     Review of Systems  Constitutional: No fever or chills, No Night Sweats  Eyes: No Blurry Vision or Eye sight problems  ENT: No Nasal Discharge, Hoarseness, sore throat  Cardiovascular: no chest pain, no palpitations and no syncope.   Respiratory: no cough, no shortness of breath during exertion and no shortness of breath at rest.   Gastrointestinal: + abdominal pain, no nausea and no vomiting.   : + diarrhea and constipation. No vaginal discharge, burning with urination, no blood in urine or stools  Skin: + hives. No Skin rashes or Lesions  Neuro: No Headache, no dizziness or Numbness or tingling  Psych: + Anxiety. No depression or sleeping problems  Heme: No Easy bleeding or brusing.   MSK: + lower back pain, thigh pain    Objective   /80   Pulse 66   Ht 1.702 m (5' 7\")   Wt 81.6 kg (180 lb)   SpO2 96%   BMI 28.19 kg/m²     Physical Exam  Constitutional: Alert and in no acute distress. Well developed, well nourished.   Head and Face: Head and face: Normal.    Eyes: Normal external exam. Pupils were equal in size, round, reactive to light (PERRL) with normal accommodation and extraocular movements intact (EOMI).   Ears, Nose, Mouth, and Throat: External inspection of ears and nose: Normal.  Hearing: Normal.  Nasal mucosa, septum, and turbinates: Normal.  Lips, teeth, and gums: " Normal.  Oropharynx: Normal.   Neck: No neck mass was observed. Supple. Thyroid not enlarged and there were no palpable thyroid nodules.   Cardiovascular: Heart rate and rhythm were normal, normal S1 and S2. Pedal pulses: Normal. No peripheral edema.   Pulmonary: No respiratory distress. Clear bilateral breath sounds.   Abdomen: Soft nontender; no abdominal mass palpated. Normal bowel sounds. No organomegaly.   Musculoskeletal: No joint swelling seen, normal movements of all extremities. Range of motion: Normal.  Muscle strength/tone: Normal.    Skin: Normal skin color and pigmentation, normal skin turgor, and no rash.   Neurologic: Deep tendon reflexes were 2+ and symmetric.   Psychiatric: Judgment and insight: Intact. Mood and affect: Normal.  Lymphatic: No cervical lymphadenopathy. Palpation of lymph nodes in axillae: Normal.  Palpation of lymph nodes in groin: Normal.    Lab Results   Component Value Date    WBC 4.2 (L) 02/21/2024    HGB 14.7 02/21/2024    HCT 45.3 02/21/2024     02/21/2024    CHOL 262 (H) 02/21/2024    TRIG 133 02/21/2024    HDL 53.3 02/21/2024    ALT 17 02/21/2024    AST 15 02/21/2024     02/21/2024    K 4.3 02/21/2024     02/21/2024    CREATININE 0.72 02/21/2024    BUN 16 02/21/2024    CO2 30 02/21/2024    TSH 2.43 02/21/2024    HGBA1C 5.2 02/21/2024       MR IAC w and wo IV contrast  Narrative: Interpreted By:  Keren Capps and Muddasani Dheeraj   STUDY:  MR IAC W AND WO IV CONTRAST;  2/27/2024 9:19 am      INDICATION:  Signs/Symptoms:Left asymmetric sensorineural hearing loss.      COMPARISON:  MRI of the brain 05/06/2011.      ACCESSION NUMBER(S):  [id number]      ORDERING CLINICIAN:  YONAS NEGRON      TECHNIQUE:  T2, FLAIR, DWI, gradient echo T2 and T1 weighted images of the brain  and high-resolution T1 weighted and T2 weighted images through the  region of internal auditory canals were acquired. Post contrast T1  weighted images of the whole brain and  high-resolution axial and  coronal T1 weighted images through the internal auditory canals were  acquired after administration of 17 mL Dotarem gadolinium based  intravenous contrast.      FINDINGS:  CSF Spaces: Ventricles, sulci and basal cisterns are within normal  limits. Along the right planum sphenoidale, there is an avidly  enhancing extra-axial dural-based lesion measuring up to 0.4 cm with  similar extension along the right optic nerve, compatible with a  meningioma and similar to previous MRI from 2011. No abnormal  extra-axial collection      Parenchyma: No diffusion restriction to suggest acute infarction.  Minimal nonspecific foci of T2 and FLAIR hyperintense signal  throughout the supratentorial white matter may be within normal  limits for patient's age. Regions of increased T2 signal within the  bilateral basal ganglia are favored to represent prominent  perivascular spaces. A venous angioma in the right frontal operculum  is again incidentally noted. No abnormal parenchymal enhancement. No  mass effect or midline shift. The cerebellar tonsils extend  approximately 5 mm below the foramen magnum, unchanged from previous  and consistent with a Chiari 1 malformation. Major intracranial flow  voids are patent.      IAC region: There is no focus of abnormal enhancement or mass effect  in bilateral cerebellopontine angle cisterns. There is no focus of  abnormal enhancement within bilateral internal auditory canals.  Bilateral inner ear structures demonstrate expected signal and  postcontrast appearance. There is a vascular loop associated with the  right anterior inferior cerebellar artery which is in close proximity  to the right porous acoustic and cranial nerves as they enter the  right internal auditory canal.      Paranasal Sinuses and Mastoids: The visualized paranasal sinuses are  well-aerated. Right mastoid effusion. Left mastoid air cells are  clear.      Impression: Normal MRI of the bilateral  internal auditory canals. No evidence of  retrocochlear mass.      Unchanged extra-axial dural-based lesion along the right planum  sphenoidale, compatible with a meningioma.      Stable Chiari 1 malformation.      I personally reviewed the images/study and I agree with the findings  as stated by Dr. Platt. This study was interpreted at [facility], [place], [place].      MACRO:  None      Signed by: Keren Capps 2/27/2024 2:26 PM  Dictation workstation:   [id number]      Assessment/Plan   Diagnoses and all orders for this visit:  Annual physical exam  Other specified menopausal and perimenopausal disorders  -     XR DEXA bone density; Future  Visit for screening mammogram  -     BI mammo bilateral screening tomosynthesis; Future  Elevated lipoprotein(a)  -     atorvastatin (Lipitor) 10 mg tablet; Take 1 tablet (10 mg) by mouth once daily at bedtime.  -     Lipid Panel; Future  -     Lipoprotein a; Future  -     Comprehensive Metabolic Panel; Future  -     Follow Up In [facility] - PCP - Established; Future  Moderate recurrent major depression (CMS/HCC)  -     sertraline (Zoloft) 100 mg tablet; Take 1.5 tablets (150 mg) by mouth once daily.  -     [facility]erral to [facility]; Future  Anxiety  -     sertraline (Zoloft) 100 mg tablet; Take 1.5 tablets (150 mg) by mouth once daily.  -     Referral to [facility]; Future  Chronic midline low back pain without sciatica  -     X-ray scoliosis 2 View (NON EOS); Future  -     XR lumbar spine 6+ views including oblique flexion extension; Future        Dear Chiquis Howard     It was my pleasure to take care of you today in the office. Below are the things we discussed today:    1. 1. Immunizations: Yearly Flu shot is recommended.          a: COVID: Please get booster from the pharmacy          b: Tetanus: Up-to-date. Last done in 2017         c: Shingrix: The patient will come back for it      2. Blood Work: Reviewed  3. Seen your dentist twice a year  4. Yearly Eye exam is recommended    5. BMI: Overweight   6: Diet recommendations:   Eat Clean, Try to have as many home cooked meals as possible  Avoid processed foods which contain excess calories, sugar, and sodium.    7. Exercise recommendations:   150 minutes a week to maintain your weight     If you have to loose weight, you need a better diet and exercise plan.     8. Supplements recommended:  a - Calcium 600 mg up to twice a day to get a total of 1200 mg. Each 8 oz of milk or yogurt or 1 oz of cheese, 1 Banana, 1 serving of green Leafy vegetable has about 300 mg of Calcium, so you may subtract that amount. Calcium citrate is the only acceptable supplement to take if you take an acid suppressing medication like Prilosec; otherwise Calcium carbonate is acceptable too (It can cause Constipation).   b - Vitamin D - 2000 IU daily     9. Please get your Living will / Advance directive completed if you do not have one already. Please make sure our office has a copy of the latest one.     10. Colonoscopy: Uptodate. Last done in Aug 2017, repeat in Aug 2027  11. Mammogram: Uptodate. Ordered for April 2024   12. PAP: S/P hysterectomy. The patient will follow up with OBGYN   13. DEXA: Ordered   14: Skin Check: Please see Dermatology once a year for a Skin Check.     15. Rosacea: Please follow up with Dermatology.    16. Anxiety and depression: Not well- controlled. Increase Zoloft to 150 mg. Access clinic referral.     17. Hyperlipidemia: LDL is 182. Elevated lipoprotein A. The patient will start atorvastatin and let me know if she starts to experience any side effects. She will recheck numbers in 3 months.    18. Hives: Allergy immunology referral.    19. Chronic lower back pain: Lumbar spine x-ray ordered. Scoliosis x-ray ordered. Physical therapy referral.     Follow up in 3 months. Please get blood work done prior to your appointment.    Follow up in  one year for a Physical. Please call the office before your Physical to see if you need blood work completed prior to your physical.     Please call me if any questions arise from now until your next visit. I will call you after I am done seeing patients. A Doctor is always available by phone when the office is closed. Please feel free to call for help with any problem that you feel shouldn't wait until the office re-opens.     Scribe Attestation  By signing my name below, [employer] Scribe   attest that this documentation has been prepared under the direction and in the presence of Sara Ma MD.

## 2024-02-28 ENCOUNTER — OFFICE VISIT (OUTPATIENT)
Dept: PRIMARY CARE | Facility: CLINIC | Age: 64
End: 2024-02-28
Payer: COMMERCIAL

## 2024-02-28 VITALS
OXYGEN SATURATION: 96 % | BODY MASS INDEX: 28.25 KG/M2 | HEART RATE: 66 BPM | HEIGHT: 67 IN | SYSTOLIC BLOOD PRESSURE: 121 MMHG | DIASTOLIC BLOOD PRESSURE: 80 MMHG | WEIGHT: 180 LBS

## 2024-02-28 DIAGNOSIS — G89.29 CHRONIC MIDLINE LOW BACK PAIN WITHOUT SCIATICA: ICD-10-CM

## 2024-02-28 DIAGNOSIS — Z00.00 ANNUAL PHYSICAL EXAM: Primary | ICD-10-CM

## 2024-02-28 DIAGNOSIS — F41.9 ANXIETY: ICD-10-CM

## 2024-02-28 DIAGNOSIS — E78.41 ELEVATED LIPOPROTEIN(A): ICD-10-CM

## 2024-02-28 DIAGNOSIS — N95.8 OTHER SPECIFIED MENOPAUSAL AND PERIMENOPAUSAL DISORDERS: ICD-10-CM

## 2024-02-28 DIAGNOSIS — M54.50 CHRONIC MIDLINE LOW BACK PAIN WITHOUT SCIATICA: ICD-10-CM

## 2024-02-28 DIAGNOSIS — Z12.31 VISIT FOR SCREENING MAMMOGRAM: ICD-10-CM

## 2024-02-28 DIAGNOSIS — F33.1 MODERATE RECURRENT MAJOR DEPRESSION (MULTI): ICD-10-CM

## 2024-02-28 PROBLEM — S82.839A FRACTURE OF FIBULA, DISTAL: Status: RESOLVED | Noted: 2023-10-09 | Resolved: 2024-02-28

## 2024-02-28 PROBLEM — M25.572 ANKLE PAIN, LEFT: Status: RESOLVED | Noted: 2023-10-09 | Resolved: 2024-02-28

## 2024-02-28 PROBLEM — E66.3 OVERWEIGHT (BMI 25.0-29.9): Status: RESOLVED | Noted: 2023-10-09 | Resolved: 2024-02-28

## 2024-02-28 PROCEDURE — 1036F TOBACCO NON-USER: CPT | Performed by: FAMILY MEDICINE

## 2024-02-28 PROCEDURE — 99396 PREV VISIT EST AGE 40-64: CPT | Performed by: FAMILY MEDICINE

## 2024-02-28 RX ORDER — SERTRALINE HYDROCHLORIDE 100 MG/1
150 TABLET, FILM COATED ORAL DAILY
Qty: 135 TABLET | Refills: 0 | Status: SHIPPED | OUTPATIENT
Start: 2024-02-28

## 2024-02-28 RX ORDER — ACETAMINOPHEN 500 MG
TABLET ORAL DAILY
COMMUNITY

## 2024-02-28 RX ORDER — ATORVASTATIN CALCIUM 10 MG/1
10 TABLET, FILM COATED ORAL NIGHTLY
Qty: 90 TABLET | Refills: 0 | Status: SHIPPED | OUTPATIENT
Start: 2024-02-28

## 2024-02-28 ASSESSMENT — PATIENT HEALTH QUESTIONNAIRE - PHQ9
10. IF YOU CHECKED OFF ANY PROBLEMS, HOW DIFFICULT HAVE THESE PROBLEMS MADE IT FOR YOU TO DO YOUR WORK, TAKE CARE OF THINGS AT HOME, OR GET ALONG WITH OTHER PEOPLE: NOT DIFFICULT AT ALL
SUM OF ALL RESPONSES TO PHQ9 QUESTIONS 1 AND 2: 1
1. LITTLE INTEREST OR PLEASURE IN DOING THINGS: NOT AT ALL
2. FEELING DOWN, DEPRESSED OR HOPELESS: SEVERAL DAYS

## 2024-02-28 ASSESSMENT — COLUMBIA-SUICIDE SEVERITY RATING SCALE - C-SSRS
1. IN THE PAST MONTH, HAVE YOU WISHED YOU WERE DEAD OR WISHED YOU COULD GO TO SLEEP AND NOT WAKE UP?: NO
2. HAVE YOU ACTUALLY HAD ANY THOUGHTS OF KILLING YOURSELF?: NO

## 2024-03-01 ENCOUNTER — TELEPHONE (OUTPATIENT)
Dept: OTOLARYNGOLOGY | Facility: CLINIC | Age: 64
End: 2024-03-01
Payer: COMMERCIAL

## 2024-03-01 NOTE — TELEPHONE ENCOUNTER
Patient contacted to discuss MRI IAC with and without contrast results.  No IAC mass or lesion.  There is fluid in the right mastoid.  Patient continues to report left ear crackling.  This did get worse after flying.  She will contact me after she completes a full 6 weeks course of Flonase and azelastine.  I would consider referring her to rhinology.  She is aware previous images showing a meningioma and Chiari I malformation.  All questions were answered to patient's satisfaction.    This note was created using speech recognition transcription software. Despite proofreading, several typographical errors might be present that might affect the meaning of the content. Please call with any questions.

## 2024-03-04 ENCOUNTER — HOSPITAL ENCOUNTER (OUTPATIENT)
Dept: RADIOLOGY | Facility: CLINIC | Age: 64
Discharge: HOME | End: 2024-03-04
Payer: COMMERCIAL

## 2024-03-04 DIAGNOSIS — G89.29 CHRONIC MIDLINE LOW BACK PAIN WITHOUT SCIATICA: ICD-10-CM

## 2024-03-04 DIAGNOSIS — M54.50 CHRONIC MIDLINE LOW BACK PAIN WITHOUT SCIATICA: ICD-10-CM

## 2024-03-04 DIAGNOSIS — N95.8 OTHER SPECIFIED MENOPAUSAL AND PERIMENOPAUSAL DISORDERS: ICD-10-CM

## 2024-03-04 PROCEDURE — 72082 X-RAY EXAM ENTIRE SPI 2/3 VW: CPT

## 2024-03-04 PROCEDURE — 72082 X-RAY EXAM ENTIRE SPI 2/3 VW: CPT | Performed by: RADIOLOGY

## 2024-03-04 PROCEDURE — 72110 X-RAY EXAM L-2 SPINE 4/>VWS: CPT | Performed by: RADIOLOGY

## 2024-03-04 PROCEDURE — 77080 DXA BONE DENSITY AXIAL: CPT | Performed by: RADIOLOGY

## 2024-03-04 PROCEDURE — 72114 X-RAY EXAM L-S SPINE BENDING: CPT | Mod: 59

## 2024-03-04 PROCEDURE — 77080 DXA BONE DENSITY AXIAL: CPT

## 2024-04-09 ENCOUNTER — APPOINTMENT (OUTPATIENT)
Dept: RADIOLOGY | Facility: CLINIC | Age: 64
End: 2024-04-09
Payer: COMMERCIAL

## 2024-04-22 ENCOUNTER — TELEPHONE (OUTPATIENT)
Dept: PRIMARY CARE | Facility: CLINIC | Age: 64
End: 2024-04-22
Payer: COMMERCIAL

## 2024-05-03 ENCOUNTER — APPOINTMENT (OUTPATIENT)
Dept: DERMATOLOGY | Facility: CLINIC | Age: 64
End: 2024-05-03
Payer: COMMERCIAL

## 2024-05-31 ENCOUNTER — APPOINTMENT (OUTPATIENT)
Dept: PRIMARY CARE | Facility: CLINIC | Age: 64
End: 2024-05-31
Payer: COMMERCIAL

## 2024-06-17 ENCOUNTER — APPOINTMENT (OUTPATIENT)
Dept: OTOLARYNGOLOGY | Facility: CLINIC | Age: 64
End: 2024-06-17
Payer: COMMERCIAL

## 2024-06-17 ENCOUNTER — PATIENT MESSAGE (OUTPATIENT)
Dept: PRIMARY CARE | Facility: CLINIC | Age: 64
End: 2024-06-17

## 2024-06-17 VITALS — HEIGHT: 67 IN | TEMPERATURE: 97 F | WEIGHT: 157 LBS | BODY MASS INDEX: 24.64 KG/M2

## 2024-06-17 DIAGNOSIS — E78.41 ELEVATED LIPOPROTEIN(A): Primary | ICD-10-CM

## 2024-06-17 DIAGNOSIS — F41.9 ANXIETY: ICD-10-CM

## 2024-06-17 DIAGNOSIS — H61.21 IMPACTED CERUMEN OF RIGHT EAR: Primary | ICD-10-CM

## 2024-06-17 DIAGNOSIS — F33.1 MODERATE RECURRENT MAJOR DEPRESSION (MULTI): ICD-10-CM

## 2024-06-17 DIAGNOSIS — H69.92 DYSFUNCTION OF LEFT EUSTACHIAN TUBE: ICD-10-CM

## 2024-06-17 PROCEDURE — 1036F TOBACCO NON-USER: CPT | Performed by: NURSE PRACTITIONER

## 2024-06-17 PROCEDURE — 99213 OFFICE O/P EST LOW 20 MIN: CPT | Performed by: NURSE PRACTITIONER

## 2024-06-17 RX ORDER — AZELASTINE 1 MG/ML
2 SPRAY, METERED NASAL 2 TIMES DAILY
Qty: 90 ML | Refills: 3 | Status: SHIPPED | OUTPATIENT
Start: 2024-06-17 | End: 2025-06-17

## 2024-06-17 RX ORDER — FLUTICASONE PROPIONATE 50 MCG
1 SPRAY, SUSPENSION (ML) NASAL 2 TIMES DAILY
Qty: 48 G | Refills: 3 | Status: SHIPPED | OUTPATIENT
Start: 2024-06-17 | End: 2025-06-17

## 2024-06-17 ASSESSMENT — ENCOUNTER SYMPTOMS
DEPRESSION: 0
LOSS OF SENSATION IN FEET: 0
OCCASIONAL FEELINGS OF UNSTEADINESS: 1

## 2024-06-17 NOTE — PROGRESS NOTES
Subjective   Patient ID: Chiquis Howard is a 63 y.o. female who presents for Cerumen Impaction (Right ear check).  HPI  This patient presents for a follow-up visit.  At her last visit in February 2024 she had left eustachian tube dysfunction.  She was also noted to have a left asymmetric sensorineural hearing loss.  MRI IAC with and without contrast was negative for any mass or lesion but she did have a small left mastoid effusion.  Patient was quite bothered by ongoing crackling.  This has resolved well with azelastine and Flonase.  She would like refills.  Today, she denies any otalgia, otorrhea.  Review of Systems  A comprehensive or 10 points review of the patient´s constitutional, neurological, HEENT, pulmonary, cardiovascular and genito-urinary systems showed only those mentioned in history of present illness.    Objective   Physical Exam  Constitutional: no fever, chills, weight loss or weight gain   General appearance: Appears well, well-nourished, well groomed. No acute distress.   Communication: Normal communication   Psychiatric: Oriented to person, place and time. Normal mood and affect.   Neurologic: Cranial nerves II-XII grossly intact and symmetric bilaterally.   Head and Face:   Head: Atraumatic with no masses, lesions or scarring.   Face: Normal symmetry, no paralysis, synkinesis or facial tic. No scars or deformities.     Eyes: Conjunctiva not edematous or erythematous   Ears: External inspection of ears with no deformity, scars or masses.  Right canal with cerumen impaction.  Left canal clear.  TM intact.  No effusion or retraction noted.     Neck: Normal appearing, symmetric, trachea midline.   Cardiovascular: Examination of peripheral vascular system shows no clubbing or cyanosis.   Respiratory: No respiratory distress increased work of breathing. Inspection of the chest with symmetric chest expansion and normal respiratory effort.   Skin: No rashes in the head or neck    Assessment/Plan     This  patient presents for subsequent evaluation of acute acquired right-sided cerumen impaction and chronic left eustachian tube dysfunction.    Reassurance given that otologic exam is normal after cleaning.  I am happy to send refills for her nasal sprays.  She may follow-up as needed for cleanings.  All questions were answered to patient's satisfaction.    This note was created using speech recognition transcription software. Despite proofreading, several typographical errors might be present that might affect the meaning of the content. Please call with any questions.  Patient ID: Chiquis Howard is a 63 y.o. female.    Ear cerumen removal    Date/Time: 6/17/2024 4:02 PM    Performed by: DANYA Arana  Authorized by: DANYA Arana    Consent:     Consent obtained:  Verbal    Consent given by:  Patient    Risks discussed:  Pain    Alternatives discussed:  No treatment  Procedure details:     Location:  R ear    Procedure type comment:  Suction    Procedure outcomes: cerumen removed    Post-procedure details:     Inspection:  No bleeding, ear canal clear and TM intact    Hearing quality:  Normal    Procedure completion:  Tolerated well, no immediate complications         DANYA Arana 06/17/24 3:59 PM

## 2024-06-18 RX ORDER — SERTRALINE HYDROCHLORIDE 100 MG/1
150 TABLET, FILM COATED ORAL DAILY
Qty: 135 TABLET | Refills: 2 | Status: SHIPPED | OUTPATIENT
Start: 2024-06-18

## 2024-06-19 RX ORDER — SIMVASTATIN 10 MG/1
10 TABLET, FILM COATED ORAL NIGHTLY
Qty: 90 TABLET | Refills: 0 | Status: SHIPPED | OUTPATIENT
Start: 2024-06-19

## 2024-07-01 ENCOUNTER — HOSPITAL ENCOUNTER (OUTPATIENT)
Dept: RADIOLOGY | Facility: CLINIC | Age: 64
Discharge: HOME | End: 2024-07-01
Payer: COMMERCIAL

## 2024-07-01 DIAGNOSIS — Z12.31 VISIT FOR SCREENING MAMMOGRAM: ICD-10-CM

## 2024-07-01 PROCEDURE — 77063 BREAST TOMOSYNTHESIS BI: CPT | Performed by: RADIOLOGY

## 2024-07-01 PROCEDURE — 77067 SCR MAMMO BI INCL CAD: CPT | Performed by: RADIOLOGY

## 2024-07-01 PROCEDURE — 77067 SCR MAMMO BI INCL CAD: CPT

## 2024-07-02 ENCOUNTER — APPOINTMENT (OUTPATIENT)
Dept: RADIOLOGY | Facility: CLINIC | Age: 64
End: 2024-07-02
Payer: COMMERCIAL

## 2024-08-26 ENCOUNTER — APPOINTMENT (OUTPATIENT)
Dept: DERMATOLOGY | Facility: CLINIC | Age: 64
End: 2024-08-26
Payer: COMMERCIAL

## 2024-09-16 ENCOUNTER — LAB (OUTPATIENT)
Dept: LAB | Facility: LAB | Age: 64
End: 2024-09-16
Payer: COMMERCIAL

## 2024-09-16 DIAGNOSIS — E78.41 ELEVATED LIPOPROTEIN(A): ICD-10-CM

## 2024-09-16 LAB
ALBUMIN SERPL BCP-MCNC: 4.3 G/DL (ref 3.4–5)
ALP SERPL-CCNC: 48 U/L (ref 33–136)
ALT SERPL W P-5'-P-CCNC: 21 U/L (ref 7–45)
ANION GAP SERPL CALC-SCNC: 14 MMOL/L (ref 10–20)
AST SERPL W P-5'-P-CCNC: 34 U/L (ref 9–39)
BILIRUB SERPL-MCNC: 0.4 MG/DL (ref 0–1.2)
BUN SERPL-MCNC: 9 MG/DL (ref 6–23)
CALCIUM SERPL-MCNC: 9.4 MG/DL (ref 8.6–10.6)
CHLORIDE SERPL-SCNC: 106 MMOL/L (ref 98–107)
CHOLEST SERPL-MCNC: 260 MG/DL (ref 0–199)
CHOLESTEROL/HDL RATIO: 4.6
CO2 SERPL-SCNC: 27 MMOL/L (ref 21–32)
CREAT SERPL-MCNC: 0.72 MG/DL (ref 0.5–1.05)
EGFRCR SERPLBLD CKD-EPI 2021: >90 ML/MIN/1.73M*2
GLUCOSE SERPL-MCNC: 99 MG/DL (ref 74–99)
HDLC SERPL-MCNC: 56.3 MG/DL
LDLC SERPL CALC-MCNC: 175 MG/DL
NON HDL CHOLESTEROL: 204 MG/DL (ref 0–149)
POTASSIUM SERPL-SCNC: 3.8 MMOL/L (ref 3.5–5.3)
PROT SERPL-MCNC: 6.6 G/DL (ref 6.4–8.2)
SODIUM SERPL-SCNC: 143 MMOL/L (ref 136–145)
TRIGL SERPL-MCNC: 143 MG/DL (ref 0–149)
VLDL: 29 MG/DL (ref 0–40)

## 2024-09-16 PROCEDURE — 80061 LIPID PANEL: CPT

## 2024-09-16 PROCEDURE — 80053 COMPREHEN METABOLIC PANEL: CPT

## 2024-09-16 PROCEDURE — 82172 ASSAY OF APOLIPOPROTEIN: CPT

## 2024-09-16 PROCEDURE — 36415 COLL VENOUS BLD VENIPUNCTURE: CPT

## 2024-09-19 LAB — LPA SERPL-MCNC: 34 MG/DL

## 2024-09-26 ENCOUNTER — APPOINTMENT (OUTPATIENT)
Dept: PRIMARY CARE | Facility: CLINIC | Age: 64
End: 2024-09-26
Payer: COMMERCIAL

## 2024-09-26 VITALS
OXYGEN SATURATION: 95 % | DIASTOLIC BLOOD PRESSURE: 86 MMHG | HEART RATE: 60 BPM | WEIGHT: 180 LBS | BODY MASS INDEX: 28.25 KG/M2 | HEIGHT: 67 IN | SYSTOLIC BLOOD PRESSURE: 132 MMHG

## 2024-09-26 DIAGNOSIS — E66.3 OVERWEIGHT WITH BODY MASS INDEX (BMI) OF 28 TO 28.9 IN ADULT: ICD-10-CM

## 2024-09-26 DIAGNOSIS — E78.41 ELEVATED LIPOPROTEIN(A): Primary | ICD-10-CM

## 2024-09-26 DIAGNOSIS — E55.9 VITAMIN D DEFICIENCY: ICD-10-CM

## 2024-09-26 DIAGNOSIS — Z23 ENCOUNTER FOR IMMUNIZATION: ICD-10-CM

## 2024-09-26 PROCEDURE — 3008F BODY MASS INDEX DOCD: CPT | Performed by: FAMILY MEDICINE

## 2024-09-26 PROCEDURE — 90656 IIV3 VACC NO PRSV 0.5 ML IM: CPT | Performed by: FAMILY MEDICINE

## 2024-09-26 PROCEDURE — 90471 IMMUNIZATION ADMIN: CPT | Performed by: FAMILY MEDICINE

## 2024-09-26 PROCEDURE — 1036F TOBACCO NON-USER: CPT | Performed by: FAMILY MEDICINE

## 2024-09-26 PROCEDURE — 99214 OFFICE O/P EST MOD 30 MIN: CPT | Performed by: FAMILY MEDICINE

## 2024-09-26 RX ORDER — EZETIMIBE 10 MG/1
10 TABLET ORAL DAILY
Qty: 90 TABLET | Refills: 1 | Status: SHIPPED | OUTPATIENT
Start: 2024-09-26

## 2024-09-26 RX ORDER — SEMAGLUTIDE 0.25 MG/.5ML
0.25 INJECTION, SOLUTION SUBCUTANEOUS WEEKLY
Qty: 2 ML | Refills: 0 | Status: SHIPPED | OUTPATIENT
Start: 2024-09-26 | End: 2024-10-18

## 2024-09-26 NOTE — PROGRESS NOTES
"Subjective   Patient ID: Chiquis Howard is a 63 y.o. female who presents for Follow-up.    HPI   The patient reports that she stopped taking the atorvastatin because she was not able to tolerate it. She has also tried simvastatin in the past and experienced the same symptoms. Currently, she is taking Zoloft for anxiety and depression as controlled and is tolerating them well. She mentions that she is having a difficult time losing weight with diet as well as exercise but has not been seeing results.    Review of Systems  Constitutional: No fever or chills  Cardiovascular: no chest pain, no palpitations and no syncope.   Respiratory: no cough, no shortness of breath during exertion and no shortness of breath at rest.   Gastrointestinal: no abdominal pain, no nausea and no vomiting.  Neuro: No Headache, no dizziness    Objective   /86   Pulse 60   Ht 1.702 m (5' 7\")   Wt 81.6 kg (180 lb)   SpO2 95%   BMI 28.19 kg/m²     Physical Exam  Constitutional: Alert and in no acute distress. Well developed, well nourished  Head and Face: Head and face: Normal.    Cardiovascular: Heart rate and rhythm were normal, normal S1 and S2. No peripheral edema.   Pulmonary: No respiratory distress. Clear bilateral breath sounds.  Musculoskeletal: Gait and station: Normal. Muscle strength/tone: Normal.   Skin: Normal skin color and pigmentation, normal skin turgor, and no rash.    Psychiatric: Judgment and insight: Intact. Mood and affect: Normal.    Lab Results   Component Value Date    WBC 4.2 (L) 02/21/2024    HGB 14.7 02/21/2024    HCT 45.3 02/21/2024     02/21/2024    CHOL 260 (H) 09/16/2024    TRIG 143 09/16/2024    HDL 56.3 09/16/2024    ALT 21 09/16/2024    AST 34 09/16/2024     09/16/2024    K 3.8 09/16/2024     09/16/2024    CREATININE 0.72 09/16/2024    BUN 9 09/16/2024    CO2 27 09/16/2024    TSH 2.43 02/21/2024    HGBA1C 5.2 02/21/2024       BI mammo bilateral screening tomosynthesis  Narrative: " Interpreted By:  Citlaly Torres,   STUDY:  BI MAMMO BILATERAL SCREENING TOMOSYNTHESIS;  7/1/2024 8:21 am      ACCESSION NUMBER(S):  [id number]      ORDERING CLINICIAN:  VALDEZ FREIRE      INDICATION:  Screening.      COMPARISON:  04/05/2023 and 12/06/2021.      FINDINGS:  2D and tomosynthesis images were reviewed at 1 mm slice thickness.      Density:  The breast tissue is heterogeneously dense, which may  obscure small masses.      No suspicious masses or calcifications are identified.      Impression: No mammographic evidence of malignancy.      BI-RADS CATEGORY:  BI-RADS Category:  1 Negative.  Recommendation:  Annual Screening.  Recommended Date:  1 Year.  Laterality:  Bilateral.              For any future breast imaging appointments, please call [phone]).          MACRO:  None      Signed by: Citlaly Torres 7/3/2024 5:26 PM  Dictation workstation:   [id number]      Assessment/Plan   Assessment & Plan  Elevated lipoprotein(a)  The patient is not able to tolerate a statin. Start Zetia.  Orders:    Follow Up In [facility] - PCP - Established    ezetimibe (Zetia) 10 mg tablet; Take 1 tablet (10 mg) by mouth once daily.    Follow Up In [facility] - PCP - Health Maintenance; Future    CBC; Future    Comprehensive Metabolic Panel; Future    Lipid Panel; Future    Overweight with body mass index (BMI) of 28 to 28.9 in adult  Start Wegovy.   Orders:    semaglutide, weight loss, (Wegovy) 0.25 mg/0.5 mL pen injector; Inject 0.25 mg under the skin 1 (one) time per week for 4 doses.    Hemoglobin A1C; Future    TSH with reflex to Free T4 if abnormal; Future      Your yearly Physical is due in: Feb 2025   When you call the office for your yearly Physical, please ask them to inform me to order your blood work, so that you can get the fasting blood work before your appointment and we can discuss the results at your physical.      Please call me if any questions arise from now until your next  visit. I will call you after I am done seeing patients. A Doctor is always available by phone when the office is closed. Please feel free to call for help with any problem that you feel shouldn't wait until the office re-opens.     Scribe Attestation  By signing my name below, I, Romana Vee   attest that this documentation has been prepared under the direction and in the presence of Sara Ma MD.

## 2024-10-10 ENCOUNTER — PATIENT MESSAGE (OUTPATIENT)
Dept: PRIMARY CARE | Facility: CLINIC | Age: 64
End: 2024-10-10
Payer: COMMERCIAL

## 2024-10-10 DIAGNOSIS — L71.8 OTHER ROSACEA: Primary | ICD-10-CM

## 2024-10-15 ENCOUNTER — TELEPHONE (OUTPATIENT)
Dept: DERMATOLOGY | Facility: CLINIC | Age: 64
End: 2024-10-15
Payer: COMMERCIAL

## 2024-10-15 NOTE — TELEPHONE ENCOUNTER
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

## 2024-10-16 ENCOUNTER — TELEPHONE (OUTPATIENT)
Dept: DERMATOLOGY | Facility: CLINIC | Age: 64
End: 2024-10-16
Payer: COMMERCIAL

## 2024-10-18 RX ORDER — DOXYCYCLINE HYCLATE 50 MG/1
100 CAPSULE, GELATIN COATED ORAL 2 TIMES DAILY
Qty: 180 CAPSULE | Refills: 0 | Status: SHIPPED | OUTPATIENT
Start: 2024-10-18

## 2024-12-19 ENCOUNTER — APPOINTMENT (OUTPATIENT)
Dept: OTOLARYNGOLOGY | Facility: CLINIC | Age: 64
End: 2024-12-19
Payer: COMMERCIAL

## 2024-12-19 VITALS — WEIGHT: 166.5 LBS | BODY MASS INDEX: 26.08 KG/M2

## 2024-12-19 DIAGNOSIS — H61.23 BILATERAL IMPACTED CERUMEN: Primary | ICD-10-CM

## 2024-12-19 DIAGNOSIS — H60.543 ECZEMA OF BOTH EXTERNAL EARS: ICD-10-CM

## 2024-12-19 PROCEDURE — 1036F TOBACCO NON-USER: CPT | Performed by: NURSE PRACTITIONER

## 2024-12-19 PROCEDURE — 99213 OFFICE O/P EST LOW 20 MIN: CPT | Performed by: NURSE PRACTITIONER

## 2024-12-19 ASSESSMENT — PATIENT HEALTH QUESTIONNAIRE - PHQ9
1. LITTLE INTEREST OR PLEASURE IN DOING THINGS: NOT AT ALL
SUM OF ALL RESPONSES TO PHQ9 QUESTIONS 1 AND 2: 0
2. FEELING DOWN, DEPRESSED OR HOPELESS: NOT AT ALL

## 2024-12-19 NOTE — PROGRESS NOTES
Subjective   Patient ID: Chiquis Howard is a 64 y.o. female who presents for Follow-up (Ear check).  HPI  This patient presents for a follow-up visit..  Today, she denies any otalgia, otorrhea.  She does not wear hearing amplification.  She complains of flaking skin to bilateral outer ears.  Review of Systems  A comprehensive or 10 points review of the patient´s constitutional, neurological, HEENT, pulmonary, cardiovascular and genito-urinary systems showed only those mentioned in history of present illness.    Objective   Physical Exam  Constitutional: no fever, chills, weight loss or weight gain   General appearance: Appears well, well-nourished, well groomed. No acute distress.   Communication: Normal communication   Psychiatric: Oriented to person, place and time. Normal mood and affect.   Neurologic: Cranial nerves II-XII grossly intact and symmetric bilaterally.   Head and Face:   Head: Atraumatic with no masses, lesions or scarring.   Face: Normal symmetry, no paralysis, synkinesis or facial tic. No scars or deformities.     Eyes: Conjunctiva not edematous or erythematous   Ears: External inspection of ears with no deformity, scars or masses with the exception of flaking skin to bilateral conchal bowls.  Bilateral canals with cerumen impactions  Neck: Normal appearing, symmetric, trachea midline.   Cardiovascular: Examination of peripheral vascular system shows no clubbing or cyanosis.   Respiratory: No respiratory distress increased work of breathing. Inspection of the chest with symmetric chest expansion and normal respiratory effort.   Skin: No rashes in the head or neck    Assessment/Plan     This patient presents for subsequent evaluation of acute acquired right-sided cerumen impaction and chronic bilateral outer ear eczema    Reassurance given that otologic exam is normal after cleaning.  I recommended using mometasone cream previously prescribed as needed for itching or flaking skin to her outer ears.   She may follow-up as needed for cleanings.  All questions were answered to patient's satisfaction.    This note was created using speech recognition transcription software. Despite proofreading, several typographical errors might be present that might affect the meaning of the content. Please call with any questions.  Patient ID: Chiquis Howard is a 64 y.o. female.    Ear cerumen removal    Date/Time: 12/19/2024 5:37 PM    Performed by: DANYA Arana  Authorized by: DANYA Arana    Consent:     Consent obtained:  Verbal    Consent given by:  Patient    Risks discussed:  Pain    Alternatives discussed:  No treatment  Procedure details:     Location:  L ear and R ear    Procedure type comment:  Suction    Procedure outcomes: cerumen removed    Post-procedure details:     Inspection:  No bleeding, ear canal clear and TM intact    Hearing quality:  Improved    Procedure completion:  Tolerated well, no immediate complications         DANYA Arana 12/19/24 5:36 PM

## 2025-01-10 ENCOUNTER — HOSPITAL ENCOUNTER (EMERGENCY)
Facility: HOSPITAL | Age: 65
Discharge: HOME | End: 2025-01-10
Payer: COMMERCIAL

## 2025-01-10 ENCOUNTER — APPOINTMENT (OUTPATIENT)
Dept: RADIOLOGY | Facility: HOSPITAL | Age: 65
End: 2025-01-10
Payer: COMMERCIAL

## 2025-01-10 VITALS
RESPIRATION RATE: 18 BRPM | DIASTOLIC BLOOD PRESSURE: 78 MMHG | SYSTOLIC BLOOD PRESSURE: 132 MMHG | WEIGHT: 166 LBS | HEIGHT: 67 IN | HEART RATE: 70 BPM | BODY MASS INDEX: 26.06 KG/M2 | OXYGEN SATURATION: 98 % | TEMPERATURE: 98.4 F

## 2025-01-10 DIAGNOSIS — S81.812A LACERATION OF LEFT CALF: ICD-10-CM
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
## 2025-01-10 PROCEDURE — 12001 RPR S/N/AX/GEN/TRNK 2.5CM/<: CPT | Performed by: SURGERY

## 2025-01-10 PROCEDURE — 90471 IMMUNIZATION ADMIN: CPT | Performed by: SURGERY

## 2025-01-10 PROCEDURE — 73590 X-RAY EXAM OF LOWER LEG: CPT | Mod: LT

## 2025-01-10 PROCEDURE — 99283 EMERGENCY DEPT VISIT LOW MDM: CPT | Mod: 25

## 2025-01-10 PROCEDURE — 2500000001 HC RX 250 WO HCPCS SELF ADMINISTERED DRUGS (ALT 637 FOR MEDICARE OP): Performed by: SURGERY

## 2025-01-10 PROCEDURE — 2500000004 HC RX 250 GENERAL PHARMACY W/ HCPCS (ALT 636 FOR OP/ED): Performed by: SURGERY

## 2025-01-10 PROCEDURE — 2500000001 HC RX 250 WO HCPCS SELF ADMINISTERED DRUGS (ALT 637 FOR MEDICARE OP)

## 2025-01-10 PROCEDURE — 90715 TDAP VACCINE 7 YRS/> IM: CPT | Performed by: SURGERY

## 2025-01-10 PROCEDURE — 2500000005 HC RX 250 GENERAL PHARMACY W/O HCPCS: Performed by: SURGERY

## 2025-01-10 PROCEDURE — 73590 X-RAY EXAM OF LOWER LEG: CPT | Mod: LEFT SIDE | Performed by: RADIOLOGY

## 2025-01-10 PROCEDURE — 99284 EMERGENCY DEPT VISIT MOD MDM: CPT

## 2025-01-10 RX ORDER — AMOXICILLIN AND CLAVULANATE POTASSIUM 875; 125 MG/1; MG/1
1 TABLET, FILM COATED ORAL ONCE
Status: COMPLETED | OUTPATIENT
Start: 2025-01-10 | End: 2025-01-10

## 2025-01-10 RX ORDER — BACITRACIN ZINC 500 UNIT/G
OINTMENT IN PACKET (EA) TOPICAL 3 TIMES DAILY
Status: DISCONTINUED | OUTPATIENT
Start: 2025-01-10 | End: 2025-01-10 | Stop reason: HOSPADM

## 2025-01-10 RX ORDER — ACETAMINOPHEN 325 MG/1
975 TABLET ORAL ONCE
Status: COMPLETED | OUTPATIENT
Start: 2025-01-10 | End: 2025-01-10

## 2025-01-10 RX ORDER — AMOXICILLIN AND CLAVULANATE POTASSIUM 875; 125 MG/1; MG/1
1 TABLET, FILM COATED ORAL EVERY 12 HOURS
Qty: 14 TABLET | Refills: 0 | Status: SHIPPED | OUTPATIENT
Start: 2025-01-10 | End: 2025-01-17

## 2025-01-10 RX ADMIN — AMOXICILLIN AND CLAVULANATE POTASSIUM 1 TABLET: 875; 125 TABLET, COATED ORAL at 20:53

## 2025-01-10 RX ADMIN — ACETAMINOPHEN 975 MG: 325 TABLET, FILM COATED ORAL at 19:47

## 2025-01-10 RX ADMIN — BACITRACIN 1 APPLICATION: 500 OINTMENT TOPICAL at 20:54

## 2025-01-10 RX ADMIN — TETANUS TOXOID, REDUCED DIPHTHERIA TOXOID AND ACELLULAR PERTUSSIS VACCINE, ADSORBED 0.5 ML: 5; 2.5; 8; 8; 2.5 SUSPENSION INTRAMUSCULAR at 20:54

## 2025-01-10 ASSESSMENT — COLUMBIA-SUICIDE SEVERITY RATING SCALE - C-SSRS
2. HAVE YOU ACTUALLY HAD ANY THOUGHTS OF KILLING YOURSELF?: NO
6. HAVE YOU EVER DONE ANYTHING, STARTED TO DO ANYTHING, OR PREPARED TO DO ANYTHING TO END YOUR LIFE?: NO
1. IN THE PAST MONTH, HAVE YOU WISHED YOU WERE DEAD OR WISHED YOU COULD GO TO SLEEP AND NOT WAKE UP?: NO

## 2025-01-10 ASSESSMENT — PAIN DESCRIPTION - ORIENTATION: ORIENTATION: LEFT;LOWER

## 2025-01-10 ASSESSMENT — PAIN - FUNCTIONAL ASSESSMENT
PAIN_FUNCTIONAL_ASSESSMENT: 0-10
PAIN_FUNCTIONAL_ASSESSMENT: 0-10

## 2025-01-10 ASSESSMENT — PAIN SCALES - GENERAL
PAINLEVEL_OUTOF10: 8
PAINLEVEL_OUTOF10: 1

## 2025-01-10 ASSESSMENT — PAIN DESCRIPTION - PAIN TYPE: TYPE: ACUTE PAIN

## 2025-01-10 ASSESSMENT — PAIN DESCRIPTION - LOCATION: LOCATION: LEG

## 2025-01-10 NOTE — ED TRIAGE NOTES
Pt comes in c/o dog bite to L calf. Noted hematoma, bleeding controlled. Dog fully vaccinated per pt and owner of dog. Pt believes tetanus is up to date

## 2025-01-11 NOTE — ED PROVIDER NOTES
Chief Complaint   Patient presents with    Animal Bite     HPI:   Chiquis Howard is an 64 y.o. female with a history of HLD presenting to the ED for chief complaint of a dog bite.  She explains she was bit on the left calf by her friend's dog this evening.  The bite was unprovoked.  She presents with her friend whose dog it was.  The dog's vaccinations are up-to-date.  No numbness or tingling.  No other injuries.  No syncope.  Does not take blood thinners.  Last tetanus is unknown.      Allergies   Allergen Reactions    Iodinated Contrast Media Hives and Shortness of breath     1998, pt states breathing was affected and hives   CT contrast in 1998.    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    Contusion of left hand, initial encounter 10/04/2017    Contusion of left hand including fingers, initial encounter    Encounter for general adult medical examination without abnormal findings 02/16/2017    Annual physical exam    Impacted cerumen, right ear 10/29/2021    Impacted cerumen of right ear    Other conditions influencing health status     Recent Weight Gain Involuntary    Other specified disorders of right ear 10/29/2021    Fullness in ear, right    Pain in right hip 08/26/2021    Right hip pain    Pain in right hip 01/09/2015    Hip pain, bilateral    Personal history of neoplasm of uncertain behavior 01/27/2014    History of neoplasm of bladder    Personal history of other diseases of the respiratory system 03/03/2015    History of acute sinusitis    Personal history of other infectious and parasitic diseases 11/24/2015    History of viral exanthem    Personal history of other specified conditions 08/25/2017    History of abdominal pain    Personal history of other specified conditions 01/27/2014    History of gross hematuria     Personal history of other specified conditions 08/14/2017    History of nausea and vomiting    Unspecified injury of left wrist, hand and finger(s), initial encounter 10/04/2017    Injury of left hand, initial encounter     Past Surgical History:   Procedure Laterality Date    LAPAROSCOPY DIAGNOSTIC / BIOPSY / ASPIRATION / LYSIS  04/07/2015    Exploratory Laparoscopy    OTHER SURGICAL HISTORY  04/07/2015    Vaginal Hysterectomy With Colpectomy    TONSILLECTOMY  04/07/2015    Tonsillectomy With Adenoidectomy     Family History   Problem Relation Name Age of Onset    Hypertension Mother      Hyperlipidemia Mother      Diabetes type II Father      Coronary artery disease Father  52    Heart failure Father          Physical Exam  Vitals and nursing note reviewed.   Constitutional:       General: She is not in acute distress.     Appearance: She is well-developed.   HENT:      Head: Normocephalic and atraumatic.      Right Ear: Tympanic membrane normal.      Left Ear: Tympanic membrane normal.      Nose: Nose normal.      Mouth/Throat:      Mouth: Mucous membranes are moist.      Pharynx: Oropharynx is clear.   Eyes:      Extraocular Movements: Extraocular movements intact.      Conjunctiva/sclera: Conjunctivae normal.      Pupils: Pupils are equal, round, and reactive to light.   Cardiovascular:      Rate and Rhythm: Normal rate and regular rhythm.      Heart sounds: No murmur heard.  Pulmonary:      Effort: Pulmonary effort is normal. No respiratory distress.      Breath sounds: Normal breath sounds.   Abdominal:      Palpations: Abdomen is soft.      Tenderness: There is no abdominal tenderness.   Musculoskeletal:         General: No swelling.      Cervical back: Neck supple.   Skin:     General: Skin is warm and dry.      Capillary Refill: Capillary refill takes less than 2 seconds.   Neurological:      Mental Status: She is alert.   Psychiatric:         Mood and Affect: Mood normal.          Laceration  Repair    Performed by: Jeff Gonzalez PA-C  Authorized by: Jeff Gonzalez PA-C    Consent:     Consent obtained:  Verbal    Consent given by:  Patient    Risks, benefits, and alternatives were discussed: yes      Risks discussed:  Infection, need for additional repair, poor cosmetic result, pain, poor wound healing and nerve damage  Universal protocol:     Patient identity confirmed:  Verbally with patient  Anesthesia:     Anesthesia method:  Local infiltration    Local anesthetic:  Lidocaine 1% w/o epi  Laceration details:     Location:  Leg    Leg location:  L lower leg    Length (cm):  1  Exploration:     Limited defect created (wound extended): no      Hemostasis achieved with:  Direct pressure    Imaging obtained: x-ray      Imaging outcome: foreign body not noted      Wound exploration: wound explored through full range of motion and entire depth of wound visualized      Wound extent: no areolar tissue violation noted, no fascia violation noted, no foreign bodies/material noted, no muscle damage noted, no nerve damage noted, no tendon damage noted, no underlying fracture noted and no vascular damage noted      Contaminated: no    Treatment:     Area cleansed with:  Saline and soap and water    Amount of cleaning:  Standard    Irrigation solution:  Sterile saline    Irrigation method:  Pressure wash    Visualized foreign bodies/material removed: no      Debridement:  Minimal    Undermining:  None    Scar revision: no    Skin repair:     Repair method:  Sutures    Suture size:  3-0    Suture material:  Prolene    Suture technique:  Simple interrupted    Number of sutures:  2  Approximation:     Approximation:  Loose  Repair type:     Repair type:  Simple  Post-procedure details:     Dressing:  Adhesive bandage    Procedure completion:  Tolerated well, no immediate complications    VS: As documented in the triage note and EMR flowsheet from this visit were reviewed.    Medical Decision Making: This is 64-year-old  female presenting to the ED for an unprovoked dog bite by her friend's dog.  The dog has all of its vaccinations for rabies was not administered today.  The patient's last tetanus vaccine was in 2017 so this was updated today.  She did sustain multiple superficial abrasions to the posterior left calf.  2 of these wounds required a stitch to loosely approximate the gaping wound she.  Tolerated the procedure well.  X-ray did not show evidence of I did aggressively cleanse the wound with saline and Hibiclens.  Fracture or retained foreign body.  She was started on Augmentin in the ED and her tetanus was updated today.  She is appropriate for outpatient management will follow-up with the PCP.  She understands return precautions.  Sutures were removed in approximately 10 days.    Diagnoses as of 01/10/25 2049   Dog bite, initial encounter   Laceration of left calf     Counseling: Spoke with the patient and discussed today´s findings, in addition to providing specific details for the plan of care and expected course.  Patient was given the opportunity to ask questions.    Discussed return precautions and importance of follow-up.  Advised to follow-up with PCP.  I specifically advised to return to the ED for changing or worsening symptoms, new symptoms, complaint specific precautions, and precautions listed on the discharge paperwork.  Educated on the common potential side effects of medications prescribed.    I advised the patient that the emergency evaluation and treatment provided today doesn't end their need for medical care. It is very important that they follow-up with their primary care provider or other specialist as instructed.    The plan of care was mutually agreed upon with the patient. The patient and/or family were given the opportunity to ask questions. All questions asked today in the ED were answered to the best of my ability with today's information.    This report was transcribed using voice recognition  software.  Every effort was made to ensure accuracy, however, inadvertently computerized transcription errors may be present.       Jeff Gonzalez PA-C  01/10/25 2949

## 2025-01-11 NOTE — DISCHARGE INSTRUCTIONS
You have been seen at a [facility] facility.  Urine x-ray of your left leg that was reassuring.  Your tetanus was updated today and you were started on an antibiotic called Augmentin.  You had 2 sutures placed in your leg that need to be removed in 10 days.  Use ice and ibuprofen for any discomfort.  Please follow-up with your primary care provider in the next 1 to 2 days for further evaluation and routine follow-up.  Please return to the emergency room if having any worsening symptoms.

## 2025-01-16 ENCOUNTER — HOSPITAL ENCOUNTER (INPATIENT)
Facility: HOSPITAL | Age: 65
LOS: 1 days | Discharge: HOME | End: 2025-01-18
Attending: EMERGENCY MEDICINE | Admitting: INTERNAL MEDICINE
Payer: COMMERCIAL

## 2025-01-16 DIAGNOSIS — L03.90 CELLULITIS: Primary | ICD-10-CM

## 2025-01-16 DIAGNOSIS — Z78.9 FAILURE OF OUTPATIENT TREATMENT: ICD-10-CM

## 2025-01-16 DIAGNOSIS — M25.50 ARTHRALGIA: ICD-10-CM
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
## 2025-01-16 DIAGNOSIS — M16.11 PRIMARY OSTEOARTHRITIS OF RIGHT HIP: ICD-10-CM

## 2025-01-16 LAB
ALBUMIN SERPL BCP-MCNC: 4.4 G/DL (ref 3.4–5)
ALP SERPL-CCNC: 53 U/L (ref 33–136)
ALT SERPL W P-5'-P-CCNC: 17 U/L (ref 7–45)
ANION GAP SERPL CALC-SCNC: 11 MMOL/L (ref 10–20)
AST SERPL W P-5'-P-CCNC: 18 U/L (ref 9–39)
BASOPHILS # BLD AUTO: 0.02 X10*3/UL (ref 0–0.1)
BASOPHILS NFR BLD AUTO: 0.4 %
BILIRUB SERPL-MCNC: 0.3 MG/DL (ref 0–1.2)
BUN SERPL-MCNC: 12 MG/DL (ref 6–23)
CALCIUM SERPL-MCNC: 9.3 MG/DL (ref 8.6–10.3)
CHLORIDE SERPL-SCNC: 106 MMOL/L (ref 98–107)
CO2 SERPL-SCNC: 26 MMOL/L (ref 21–32)
CREAT SERPL-MCNC: 0.57 MG/DL (ref 0.5–1.05)
EGFRCR SERPLBLD CKD-EPI 2021: >90 ML/MIN/1.73M*2
EOSINOPHIL # BLD AUTO: 0.22 X10*3/UL (ref 0–0.7)
EOSINOPHIL NFR BLD AUTO: 4.7 %
ERYTHROCYTE [DISTWIDTH] IN BLOOD BY AUTOMATED COUNT: 12.8 % (ref 11.5–14.5)
GLUCOSE SERPL-MCNC: 100 MG/DL (ref 74–99)
HCT VFR BLD AUTO: 41.7 % (ref 36–46)
HGB BLD-MCNC: 14 G/DL (ref 12–16)
IMM GRANULOCYTES # BLD AUTO: 0.03 X10*3/UL (ref 0–0.7)
IMM GRANULOCYTES NFR BLD AUTO: 0.6 % (ref 0–0.9)
LACTATE SERPL-SCNC: 0.9 MMOL/L (ref 0.4–2)
LYMPHOCYTES # BLD AUTO: 1.31 X10*3/UL (ref 1.2–4.8)
LYMPHOCYTES NFR BLD AUTO: 28.1 %
MCH RBC QN AUTO: 29.6 PG (ref 26–34)
MCHC RBC AUTO-ENTMCNC: 33.6 G/DL (ref 32–36)
MCV RBC AUTO: 88 FL (ref 80–100)
MONOCYTES # BLD AUTO: 0.42 X10*3/UL (ref 0.1–1)
MONOCYTES NFR BLD AUTO: 9 %
NEUTROPHILS # BLD AUTO: 2.67 X10*3/UL (ref 1.2–7.7)
NEUTROPHILS NFR BLD AUTO: 57.2 %
NRBC BLD-RTO: 0 /100 WBCS (ref 0–0)
PLATELET # BLD AUTO: 183 X10*3/UL (ref 150–450)
POTASSIUM SERPL-SCNC: 3.8 MMOL/L (ref 3.5–5.3)
PROT SERPL-MCNC: 6.8 G/DL (ref 6.4–8.2)
RBC # BLD AUTO: 4.73 X10*6/UL (ref 4–5.2)
SODIUM SERPL-SCNC: 139 MMOL/L (ref 136–145)
WBC # BLD AUTO: 4.7 X10*3/UL (ref 4.4–11.3)

## 2025-01-16 PROCEDURE — 36415 COLL VENOUS BLD VENIPUNCTURE: CPT

## 2025-01-16 PROCEDURE — 99285 EMERGENCY DEPT VISIT HI MDM: CPT | Mod: 25 | Performed by: EMERGENCY MEDICINE

## 2025-01-16 PROCEDURE — 96365 THER/PROPH/DIAG IV INF INIT: CPT

## 2025-01-16 PROCEDURE — 2500000004 HC RX 250 GENERAL PHARMACY W/ HCPCS (ALT 636 FOR OP/ED): Performed by: EMERGENCY MEDICINE

## 2025-01-16 PROCEDURE — 80053 COMPREHEN METABOLIC PANEL: CPT

## 2025-01-16 PROCEDURE — 85025 COMPLETE CBC W/AUTO DIFF WBC: CPT

## 2025-01-16 PROCEDURE — 83605 ASSAY OF LACTIC ACID: CPT

## 2025-01-16 RX ORDER — ACETAMINOPHEN 160 MG/5ML
650 SOLUTION ORAL EVERY 4 HOURS PRN
Status: DISCONTINUED | OUTPATIENT
Start: 2025-01-16 | End: 2025-01-18 | Stop reason: HOSPADM

## 2025-01-16 RX ORDER — FLUTICASONE PROPIONATE 50 MCG
1 SPRAY, SUSPENSION (ML) NASAL 2 TIMES DAILY PRN
Status: DISCONTINUED | OUTPATIENT
Start: 2025-01-16 | End: 2025-01-18 | Stop reason: HOSPADM

## 2025-01-16 RX ORDER — EZETIMIBE 10 MG/1
10 TABLET ORAL DAILY
Status: DISCONTINUED | OUTPATIENT
Start: 2025-01-17 | End: 2025-01-17

## 2025-01-16 RX ORDER — ENOXAPARIN SODIUM 100 MG/ML
40 INJECTION SUBCUTANEOUS EVERY 24 HOURS
Status: DISCONTINUED | OUTPATIENT
Start: 2025-01-17 | End: 2025-01-18 | Stop reason: HOSPADM

## 2025-01-16 RX ORDER — ACETAMINOPHEN 650 MG/1
650 SUPPOSITORY RECTAL EVERY 4 HOURS PRN
Status: DISCONTINUED | OUTPATIENT
Start: 2025-01-16 | End: 2025-01-18 | Stop reason: HOSPADM

## 2025-01-16 RX ORDER — ACETAMINOPHEN 325 MG/1
650 TABLET ORAL EVERY 4 HOURS PRN
Status: DISCONTINUED | OUTPATIENT
Start: 2025-01-16 | End: 2025-01-18 | Stop reason: HOSPADM

## 2025-01-16 RX ORDER — POLYETHYLENE GLYCOL 3350 17 G/17G
17 POWDER, FOR SOLUTION ORAL DAILY
Status: DISCONTINUED | OUTPATIENT
Start: 2025-01-17 | End: 2025-01-18 | Stop reason: HOSPADM

## 2025-01-16 RX ORDER — SERTRALINE HYDROCHLORIDE 50 MG/1
150 TABLET, FILM COATED ORAL DAILY
Status: DISCONTINUED | OUTPATIENT
Start: 2025-01-17 | End: 2025-01-18 | Stop reason: HOSPADM

## 2025-01-16 RX ORDER — CHOLECALCIFEROL (VITAMIN D3) 25 MCG
5000 TABLET ORAL DAILY
Status: DISCONTINUED | OUTPATIENT
Start: 2025-01-17 | End: 2025-01-17

## 2025-01-16 RX ADMIN — AMPICILLIN SODIUM AND SULBACTAM SODIUM 3 G: 2; 1 INJECTION, POWDER, FOR SOLUTION INTRAMUSCULAR; INTRAVENOUS at 22:22

## 2025-01-16 ASSESSMENT — COLUMBIA-SUICIDE SEVERITY RATING SCALE - C-SSRS
1. IN THE PAST MONTH, HAVE YOU WISHED YOU WERE DEAD OR WISHED YOU COULD GO TO SLEEP AND NOT WAKE UP?: NO
2. HAVE YOU ACTUALLY HAD ANY THOUGHTS OF KILLING YOURSELF?: NO
6. HAVE YOU EVER DONE ANYTHING, STARTED TO DO ANYTHING, OR PREPARED TO DO ANYTHING TO END YOUR LIFE?: NO

## 2025-01-16 ASSESSMENT — PAIN - FUNCTIONAL ASSESSMENT: PAIN_FUNCTIONAL_ASSESSMENT: 0-10

## 2025-01-16 NOTE — ED TRIAGE NOTES
TRIAGE NOTE   I saw the patient as the Clinician in Triage and performed a brief history and physical exam, established acuity, and ordered appropriate tests to develop basic plan of care. Patient will be seen by an [facility], resident and/or physician who will independently evaluate the patient. Please see subsequent provider notes for further details and disposition.     Brief HPI: In brief, Chiquis Howard is a 64 y.o. female that presents for left leg pain.  Reports that she was seen and evaluated about 6 days ago for dog bite.  Has been taking Augmentin.  Reports increasing redness and swelling since yesterday.  No other symptoms or concerns at this time.    Focused Physical exam:   Small wound with surrounding erythema on the left thigh.  There is no crepitus.  Compartments are soft.  Plan/MDM:   Treating CBC, CMP, lactate.  Patient will be seen in the back of the ED for further evaluation and treatment.  I will not be following this patient during her ED visit.  This is a preliminary evaluation during triage.    I evaluated this patient in triage with the RN. Due to the patients complaint labs and or imaging were ordered by myself in an attempt to expedite patient care however I am not participating in care after evaluation. This is a preliminary assessment. Pt does not appear in acute distress at this time. They will have a full evaluation as soon as possible. They will be cared for by another provider who will possibly order more labs, imaging or interventions. Pt did not have a full ROS or PE completed by myself however below is a summary with reasons for orders.  For the remainder of the patient's workup and ED course, please refer to the main ED provider note. We discussed need for diagnostic testing including laboratory studies and imaging.  We also discussed that patient may be asked to wait in the waiting room while these tests are pending.  They understand that if they choose to leave without having the  testing completed or resulted that we cannot rule out acute life threatening illnesses and the risks involved could lead to worsening condition, permanent disability or even death.     Please see subsequent provider note for further details and disposition

## 2025-01-17 LAB
ALBUMIN SERPL BCP-MCNC: 3.4 G/DL (ref 3.4–5)
ALP SERPL-CCNC: 45 U/L (ref 33–136)
ALT SERPL W P-5'-P-CCNC: 13 U/L (ref 7–45)
ANION GAP SERPL CALC-SCNC: 10 MMOL/L (ref 10–20)
AST SERPL W P-5'-P-CCNC: 15 U/L (ref 9–39)
BILIRUB SERPL-MCNC: 0.3 MG/DL (ref 0–1.2)
BUN SERPL-MCNC: 14 MG/DL (ref 6–23)
CALCIUM SERPL-MCNC: 8.6 MG/DL (ref 8.6–10.3)
CHLORIDE SERPL-SCNC: 108 MMOL/L (ref 98–107)
CO2 SERPL-SCNC: 25 MMOL/L (ref 21–32)
CREAT SERPL-MCNC: 0.55 MG/DL (ref 0.5–1.05)
EGFRCR SERPLBLD CKD-EPI 2021: >90 ML/MIN/1.73M*2
ERYTHROCYTE [DISTWIDTH] IN BLOOD BY AUTOMATED COUNT: 12.8 % (ref 11.5–14.5)
GLUCOSE SERPL-MCNC: 88 MG/DL (ref 74–99)
HCT VFR BLD AUTO: 35.4 % (ref 36–46)
HGB BLD-MCNC: 12.2 G/DL (ref 12–16)
MCH RBC QN AUTO: 29.6 PG (ref 26–34)
MCHC RBC AUTO-ENTMCNC: 34.5 G/DL (ref 32–36)
MCV RBC AUTO: 86 FL (ref 80–100)
NRBC BLD-RTO: 0 /100 WBCS (ref 0–0)
PLATELET # BLD AUTO: 153 X10*3/UL (ref 150–450)
POTASSIUM SERPL-SCNC: 3.6 MMOL/L (ref 3.5–5.3)
PROT SERPL-MCNC: 5.7 G/DL (ref 6.4–8.2)
RBC # BLD AUTO: 4.12 X10*6/UL (ref 4–5.2)
SODIUM SERPL-SCNC: 139 MMOL/L (ref 136–145)
WBC # BLD AUTO: 4.3 X10*3/UL (ref 4.4–11.3)

## 2025-01-17 PROCEDURE — 80053 COMPREHEN METABOLIC PANEL: CPT

## 2025-01-17 PROCEDURE — 36415 COLL VENOUS BLD VENIPUNCTURE: CPT

## 2025-01-17 PROCEDURE — 1100000001 HC PRIVATE ROOM DAILY

## 2025-01-17 PROCEDURE — 99222 1ST HOSP IP/OBS MODERATE 55: CPT | Performed by: NURSE PRACTITIONER

## 2025-01-17 PROCEDURE — 2500000004 HC RX 250 GENERAL PHARMACY W/ HCPCS (ALT 636 FOR OP/ED)

## 2025-01-17 PROCEDURE — 2500000001 HC RX 250 WO HCPCS SELF ADMINISTERED DRUGS (ALT 637 FOR MEDICARE OP)

## 2025-01-17 PROCEDURE — 2500000002 HC RX 250 W HCPCS SELF ADMINISTERED DRUGS (ALT 637 FOR MEDICARE OP, ALT 636 FOR OP/ED)

## 2025-01-17 PROCEDURE — 96372 THER/PROPH/DIAG INJ SC/IM: CPT

## 2025-01-17 PROCEDURE — 85027 COMPLETE CBC AUTOMATED: CPT

## 2025-01-17 RX ORDER — IBUPROFEN 400 MG/1
400 TABLET ORAL EVERY 6 HOURS SCHEDULED
Status: DISCONTINUED | OUTPATIENT
Start: 2025-01-17 | End: 2025-01-17

## 2025-01-17 RX ORDER — CHOLECALCIFEROL (VITAMIN D3) 1250 MCG
50000 TABLET ORAL WEEKLY
COMMUNITY

## 2025-01-17 RX ADMIN — AMPICILLIN SODIUM AND SULBACTAM SODIUM 3 G: 2; 1 INJECTION, POWDER, FOR SOLUTION INTRAMUSCULAR; INTRAVENOUS at 09:55

## 2025-01-17 RX ADMIN — SERTRALINE HYDROCHLORIDE 150 MG: 50 TABLET ORAL at 09:52

## 2025-01-17 RX ADMIN — AMPICILLIN SODIUM AND SULBACTAM SODIUM 3 G: 2; 1 INJECTION, POWDER, FOR SOLUTION INTRAMUSCULAR; INTRAVENOUS at 03:52

## 2025-01-17 RX ADMIN — ENOXAPARIN SODIUM 40 MG: 40 INJECTION SUBCUTANEOUS at 09:53

## 2025-01-17 RX ADMIN — DIPHENHYDRAMINE HYDROCHLORIDE, ZINC ACETATE: 2; .1 CREAM TOPICAL at 01:14

## 2025-01-17 RX ADMIN — AMPICILLIN SODIUM AND SULBACTAM SODIUM 3 G: 2; 1 INJECTION, POWDER, FOR SOLUTION INTRAMUSCULAR; INTRAVENOUS at 15:28

## 2025-01-17 RX ADMIN — AMPICILLIN SODIUM AND SULBACTAM SODIUM 3 G: 2; 1 INJECTION, POWDER, FOR SOLUTION INTRAMUSCULAR; INTRAVENOUS at 21:01

## 2025-01-17 SDOH — ECONOMIC STABILITY: FOOD INSECURITY: WITHIN THE PAST 12 MONTHS, YOU WORRIED THAT YOUR FOOD WOULD RUN OUT BEFORE YOU GOT THE MONEY TO BUY MORE.: NEVER TRUE

## 2025-01-17 SDOH — SOCIAL STABILITY: SOCIAL INSECURITY: ARE YOU OR HAVE YOU BEEN THREATENED OR ABUSED PHYSICALLY, EMOTIONALLY, OR SEXUALLY BY ANYONE?: NO

## 2025-01-17 SDOH — SOCIAL STABILITY: SOCIAL INSECURITY
WITHIN THE LAST YEAR, HAVE YOU BEEN RAPED OR FORCED TO HAVE ANY KIND OF SEXUAL ACTIVITY BY YOUR PARTNER OR EX-PARTNER?: NO

## 2025-01-17 SDOH — SOCIAL STABILITY: SOCIAL INSECURITY
WITHIN THE LAST YEAR, HAVE YOU BEEN KICKED, HIT, SLAPPED, OR OTHERWISE PHYSICALLY HURT BY YOUR PARTNER OR EX-PARTNER?: NO

## 2025-01-17 SDOH — ECONOMIC STABILITY: INCOME INSECURITY: IN THE PAST 12 MONTHS HAS THE ELECTRIC, GAS, OIL, OR WATER COMPANY THREATENED TO SHUT OFF SERVICES IN YOUR HOME?: NO

## 2025-01-17 SDOH — SOCIAL STABILITY: SOCIAL INSECURITY: HAVE YOU HAD ANY THOUGHTS OF HARMING ANYONE ELSE?: NO

## 2025-01-17 SDOH — SOCIAL STABILITY: SOCIAL INSECURITY: ARE THERE ANY APPARENT SIGNS OF INJURIES/BEHAVIORS THAT COULD BE RELATED TO ABUSE/NEGLECT?: NO

## 2025-01-17 SDOH — SOCIAL STABILITY: SOCIAL INSECURITY: WITHIN THE LAST YEAR, HAVE YOU BEEN HUMILIATED OR EMOTIONALLY ABUSED IN OTHER WAYS BY YOUR PARTNER OR EX-PARTNER?: NO

## 2025-01-17 SDOH — SOCIAL STABILITY: SOCIAL INSECURITY: WITHIN THE LAST YEAR, HAVE YOU BEEN AFRAID OF YOUR PARTNER OR EX-PARTNER?: NO

## 2025-01-17 SDOH — SOCIAL STABILITY: SOCIAL INSECURITY: DOES ANYONE TRY TO KEEP YOU FROM HAVING/CONTACTING OTHER FRIENDS OR DOING THINGS OUTSIDE YOUR HOME?: NO

## 2025-01-17 SDOH — SOCIAL STABILITY: SOCIAL INSECURITY: DO YOU FEEL ANYONE HAS EXPLOITED OR TAKEN ADVANTAGE OF YOU FINANCIALLY OR OF YOUR PERSONAL PROPERTY?: NO

## 2025-01-17 SDOH — SOCIAL STABILITY: SOCIAL INSECURITY: WERE YOU ABLE TO COMPLETE ALL THE BEHAVIORAL HEALTH SCREENINGS?: YES

## 2025-01-17 SDOH — ECONOMIC STABILITY: FOOD INSECURITY: WITHIN THE PAST 12 MONTHS, THE FOOD YOU BOUGHT JUST DIDN'T LAST AND YOU DIDN'T HAVE MONEY TO GET MORE.: NEVER TRUE

## 2025-01-17 SDOH — SOCIAL STABILITY: SOCIAL INSECURITY: HAS ANYONE EVER THREATENED TO HURT YOUR FAMILY OR YOUR PETS?: NO

## 2025-01-17 SDOH — SOCIAL STABILITY: SOCIAL INSECURITY: HAVE YOU HAD THOUGHTS OF HARMING ANYONE ELSE?: NO

## 2025-01-17 SDOH — SOCIAL STABILITY: SOCIAL INSECURITY: DO YOU FEEL UNSAFE GOING BACK TO THE PLACE WHERE YOU ARE LIVING?: NO

## 2025-01-17 SDOH — SOCIAL STABILITY: SOCIAL INSECURITY: ABUSE: ADULT

## 2025-01-17 ASSESSMENT — COGNITIVE AND FUNCTIONAL STATUS - GENERAL
DAILY ACTIVITIY SCORE: 24
DAILY ACTIVITIY SCORE: 24
MOBILITY SCORE: 24
PATIENT BASELINE BEDBOUND: NO
MOBILITY SCORE: 24

## 2025-01-17 ASSESSMENT — ENCOUNTER SYMPTOMS
PSYCHIATRIC NEGATIVE: 1
WOUND: 1
ALLERGIC/IMMUNOLOGIC NEGATIVE: 1
HEMATOLOGIC/LYMPHATIC NEGATIVE: 1
MUSCULOSKELETAL NEGATIVE: 1
NEUROLOGICAL NEGATIVE: 1
CARDIOVASCULAR NEGATIVE: 1
RESPIRATORY NEGATIVE: 1
ENDOCRINE NEGATIVE: 1
EYES NEGATIVE: 1
GASTROINTESTINAL NEGATIVE: 1
CONSTITUTIONAL NEGATIVE: 1

## 2025-01-17 ASSESSMENT — ACTIVITIES OF DAILY LIVING (ADL)
PATIENT'S MEMORY ADEQUATE TO SAFELY COMPLETE DAILY ACTIVITIES?: YES
HEARING - LEFT EAR: FUNCTIONAL
WALKS IN HOME: INDEPENDENT
JUDGMENT_ADEQUATE_SAFELY_COMPLETE_DAILY_ACTIVITIES: YES
HEARING - RIGHT EAR: FUNCTIONAL
DRESSING YOURSELF: INDEPENDENT
TOILETING: INDEPENDENT
BATHING: INDEPENDENT
GROOMING: INDEPENDENT
FEEDING YOURSELF: INDEPENDENT
ASSISTIVE_DEVICE: EYEGLASSES
LACK_OF_TRANSPORTATION: NO
ADEQUATE_TO_COMPLETE_ADL: YES

## 2025-01-17 ASSESSMENT — PAIN SCALES - GENERAL
PAINLEVEL_OUTOF10: 0 - NO PAIN
PAINLEVEL_OUTOF10: 0 - NO PAIN

## 2025-01-17 ASSESSMENT — LIFESTYLE VARIABLES
AUDIT-C TOTAL SCORE: 2
HOW OFTEN DO YOU HAVE 6 OR MORE DRINKS ON ONE OCCASION: NEVER
AUDIT-C TOTAL SCORE: 2
HOW OFTEN DO YOU HAVE A DRINK CONTAINING ALCOHOL: 2-4 TIMES A MONTH
HOW MANY STANDARD DRINKS CONTAINING ALCOHOL DO YOU HAVE ON A TYPICAL DAY: 1 OR 2
SKIP TO QUESTIONS 9-10: 1

## 2025-01-17 ASSESSMENT — PAIN - FUNCTIONAL ASSESSMENT
PAIN_FUNCTIONAL_ASSESSMENT: 0-10
PAIN_FUNCTIONAL_ASSESSMENT: 0-10

## 2025-01-17 ASSESSMENT — PATIENT HEALTH QUESTIONNAIRE - PHQ9
2. FEELING DOWN, DEPRESSED OR HOPELESS: NOT AT ALL
1. LITTLE INTEREST OR PLEASURE IN DOING THINGS: NOT AT ALL
SUM OF ALL RESPONSES TO PHQ9 QUESTIONS 1 & 2: 0

## 2025-01-17 NOTE — PROGRESS NOTES
01/17/25 1312   Discharge Planning   Living Arrangements Children   Support Systems Children;Friends/neighbors   Assistance Needed None   Type of Residence Private residence   Home or Post Acute Services None   Expected Discharge Disposition Home     Patient is receiving IV antibiotics.  ID following-hoping for one more day of IV antibiotics and then transitioning her back to PO antibiotics.  Plan remains for patient to return home upon discharge.  No home going needs identified at this time.  Will continue to follow for discharge planning needs.

## 2025-01-17 NOTE — HOSPITAL COURSE
History Of Present Illness  Chiquis Howard is a 64 y.o. female with a past medical history of HLD who presented to [place] ED for complaints of worsening dog bite wound. Patient initially presented to the ED on 1/10 after being bit by a friends dog. She was evaluated and discharged on Augmentin.  Patient reports increasing redness and swelling since yesterday, she became concerned for worsening infection, so she presented to the ED. There are no other complaints at this time. She denies any fever, chills, malaise.     ED work up:  Vital signs:37.2 (99), HR 65, RR 15, /87, 100% on room air  Labs: No abnormalities noted  Imaging: LLE XRAY: Soft tissue injury to the calf with soft tissue gas without osseous injury or radiopaque foreign body evident    ED medications administered:  IV Unasyn x1      Allergies  Iodinated contrast media and Statins-hmg-coa reductase inhibitors       Assessment/Plan   Assessment & Plan  Cellulitis    Chiquis Howard is a 64 y.o. female with a past medical history of HLD who presented to [facility] ED for complaints of worsening dog bite wound. Patient initially presented to the ED on 1/10 after being bit by a friends dog. She was evaluated and discharged on Augmentin.  Patient reports increasing redness and swelling since yesterday, she became concerned for worsening infection, so she presented to the ED. There are no other complaints at this time. She denies any fever, chills, malaise.     #Cellulitis  - worsening dog bite/ concern for increase redness and swelling despite PO Augmentin  - Afebrile, no other symptoms   - LLE XRAY: Soft tissue injury to the calf with soft tissue gas without osseous  injury or radiopaque foreign body evident  - Continue IV Unasyn  - Consider ID and/or Podiatry consultation- AM Team to determine based on progression of infection    #HLD  -Continue home regimen; Ben     Patient was seen by ID and had IV antibiotics and wound care  with phots.     Vitals remained stable, afebrile, WBC ok and no overnight events.   ID ok with discharge and PO antibiotics sent to  her pharmacy and wound care explained and products to her.       Discharge as planned

## 2025-01-17 NOTE — ASSESSMENT & PLAN NOTE
64-year-old lady admitted with dog bite left calf leading to cellulitis at the dog bite site and failed outpatient treatment with Augmentin.  She has been started on IV Unasyn and seen by infectious disease who concurred with the current treatment.  Also seen by wound care.  Will continue Unasyn for another 24 hours and possibly discharge home tomorrow on oral Augmentin.

## 2025-01-17 NOTE — H&P
History Of Present Illness  Chiquis Howard is a 64 y.o. female presenting with left  leg cellulitis after a recent dog bite.     Chiquis Howard is a 64 y.o. female with a past medical history of HLD who presented to [facility] ED for complaints of worsening dog bite wound. Patient initially presented to the ED on 1/10 after being bit by a friends dog. She was evaluated and discharged on Augmentin.  Patient reports increasing redness and swelling since yesterday, she became concerned for worsening infection, so she presented to the ED. There are no other complaints at this time. She denies any fever, chills, malaise.      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    Contusion of left hand, initial encounter 10/04/2017    Contusion of left hand including fingers, initial encounter    Encounter for general adult medical examination without abnormal findings 02/16/2017    Annual physical exam    Impacted cerumen, right ear 10/29/2021    Impacted cerumen of right ear    Other conditions influencing health status     Recent Weight Gain Involuntary    Other specified disorders of right ear 10/29/2021    Fullness in ear, right    Pain in right hip 08/26/2021    Right hip pain    Pain in right hip 01/09/2015    Hip pain, bilateral    Personal history of neoplasm of uncertain behavior 01/27/2014    History of neoplasm of bladder    Personal history of other diseases of the respiratory system 03/03/2015    History of acute sinusitis    Personal history of other infectious and parasitic diseases 11/24/2015    History of viral exanthem    Personal history of other specified conditions 08/25/2017    History of abdominal pain    Personal history of other specified conditions 01/27/2014    History of gross hematuria    Personal history of other specified conditions  08/14/2017    History of nausea and vomiting    Unspecified injury of left wrist, hand and finger(s), initial encounter 10/04/2017    Injury of left hand, initial encounter       Surgical History  Past Surgical History:   Procedure Laterality Date    LAPAROSCOPY DIAGNOSTIC / BIOPSY / ASPIRATION / LYSIS  04/07/2015    Exploratory Laparoscopy    OTHER SURGICAL HISTORY  04/07/2015    Vaginal Hysterectomy With Colpectomy    TONSILLECTOMY  04/07/2015    Tonsillectomy With Adenoidectomy        Social History  She reports that she has never smoked. She has never been exposed to tobacco smoke. She has never used smokeless tobacco. She reports current alcohol use. She reports that she does not use drugs.    Family History  Family History   Problem Relation Name Age of Onset    Hypertension Mother      Hyperlipidemia Mother      Diabetes type II Father      Coronary artery disease Father  52    Heart failure Father          Allergies  Iodinated contrast media and Statins-hmg-coa reductase inhibitors    Review of Systems   Constitutional: Negative.    HENT: Negative.     Eyes: Negative.    Respiratory: Negative.     Cardiovascular: Negative.    Gastrointestinal: Negative.    Endocrine: Negative.    Genitourinary: Negative.    Musculoskeletal: Negative.    Skin:  Positive for wound.   Allergic/Immunologic: Negative.    Neurological: Negative.    Hematological: Negative.    Psychiatric/Behavioral: Negative.          Physical Exam  Constitutional:       Appearance: Normal appearance.   HENT:      Mouth/Throat:      Mouth: Mucous membranes are moist.   Cardiovascular:      Rate and Rhythm: Regular rhythm.   Pulmonary:      Effort: Pulmonary effort is normal.      Breath sounds: Normal breath sounds.   Abdominal:      Palpations: Abdomen is soft.   Musculoskeletal:         General: Tenderness present.      Comments: Left  calf and below the knee    Skin:     General: Skin is warm and dry.      Comments: left calf open wound and  "below the knee    Neurological:      General: No focal deficit present.      Mental Status: She is alert and oriented to person, place, and time.          Last Recorded Vitals  Blood pressure 137/78, pulse 61, temperature 36.6 °C (97.9 °F), temperature source Temporal, resp. rate 18, height 1.676 m (5' 6\"), weight 70.3 kg (155 lb), SpO2 97%.    Relevant Results  Scheduled medications  ampicillin-sulbactam, 3 g, intravenous, q6h  enoxaparin, 40 mg, subcutaneous, q24h  ibuprofen, 400 mg, oral, q6h TISHA  polyethylene glycol, 17 g, oral, Daily  sertraline, 150 mg, oral, Daily      Continuous medications     PRN medications  PRN medications: acetaminophen **OR** acetaminophen **OR** acetaminophen, acetaminophen **OR** acetaminophen **OR** acetaminophen, diphenhydramine-zinc acetate, fluticasone     Results for orders placed or performed during the hospital encounter of 01/16/25 (from the past 24 hours)   CBC and Auto Differential   Result Value Ref Range    WBC 4.7 4.4 - 11.3 x10*3/uL    nRBC 0.0 0.0 - 0.0 /100 WBCs    RBC 4.73 4.00 - 5.20 x10*6/uL    Hemoglobin 14.0 12.0 - 16.0 g/dL    Hematocrit 41.7 36.0 - 46.0 %    MCV 88 80 - 100 fL    MCH 29.6 26.0 - 34.0 pg    MCHC 33.6 32.0 - 36.0 g/dL    RDW 12.8 11.5 - 14.5 %    Platelets 183 150 - 450 x10*3/uL    Neutrophils % 57.2 40.0 - 80.0 %    Immature Granulocytes %, Automated 0.6 0.0 - 0.9 %    Lymphocytes % 28.1 13.0 - 44.0 %    Monocytes % 9.0 2.0 - 10.0 %    Eosinophils % 4.7 0.0 - 6.0 %    Basophils % 0.4 0.0 - 2.0 %    Neutrophils Absolute 2.67 1.20 - 7.70 x10*3/uL    Immature Granulocytes Absolute, Automated 0.03 0.00 - 0.70 x10*3/uL    Lymphocytes Absolute 1.31 1.20 - 4.80 x10*3/uL    Monocytes Absolute 0.42 0.10 - 1.00 x10*3/uL    Eosinophils Absolute 0.22 0.00 - 0.70 x10*3/uL    Basophils Absolute 0.02 0.00 - 0.10 x10*3/uL   Comprehensive metabolic panel   Result Value Ref Range    Glucose 100 (H) 74 - 99 mg/dL    Sodium 139 136 - 145 mmol/L    Potassium 3.8 3.5 " - 5.3 mmol/L    Chloride 106 98 - 107 mmol/L    Bicarbonate 26 21 - 32 mmol/L    Anion Gap 11 10 - 20 mmol/L    Urea Nitrogen 12 6 - 23 mg/dL    Creatinine 0.57 0.50 - 1.05 mg/dL    eGFR >90 >60 mL/min/1.73m*2    Calcium 9.3 8.6 - 10.3 mg/dL    Albumin 4.4 3.4 - 5.0 g/dL    Alkaline Phosphatase 53 33 - 136 U/L    Total Protein 6.8 6.4 - 8.2 g/dL    AST 18 9 - 39 U/L    Bilirubin, Total 0.3 0.0 - 1.2 mg/dL    ALT 17 7 - 45 U/L   Lactate   Result Value Ref Range    Lactate 0.9 0.4 - 2.0 mmol/L   CBC   Result Value Ref Range    WBC 4.3 (L) 4.4 - 11.3 x10*3/uL    nRBC 0.0 0.0 - 0.0 /100 WBCs    RBC 4.12 4.00 - 5.20 x10*6/uL    Hemoglobin 12.2 12.0 - 16.0 g/dL    Hematocrit 35.4 (L) 36.0 - 46.0 %    MCV 86 80 - 100 fL    MCH 29.6 26.0 - 34.0 pg    MCHC 34.5 32.0 - 36.0 g/dL    RDW 12.8 11.5 - 14.5 %    Platelets 153 150 - 450 x10*3/uL   Comprehensive metabolic panel   Result Value Ref Range    Glucose 88 74 - 99 mg/dL    Sodium 139 136 - 145 mmol/L    Potassium 3.6 3.5 - 5.3 mmol/L    Chloride 108 (H) 98 - 107 mmol/L    Bicarbonate 25 21 - 32 mmol/L    Anion Gap 10 10 - 20 mmol/L    Urea Nitrogen 14 6 - 23 mg/dL    Creatinine 0.55 0.50 - 1.05 mg/dL    eGFR >90 >60 mL/min/1.73m*2    Calcium 8.6 8.6 - 10.3 mg/dL    Albumin 3.4 3.4 - 5.0 g/dL    Alkaline Phosphatase 45 33 - 136 U/L    Total Protein 5.7 (L) 6.4 - 8.2 g/dL    AST 15 9 - 39 U/L    Bilirubin, Total 0.3 0.0 - 1.2 mg/dL    ALT 13 7 - 45 U/L      Point of Care Ultrasound    (Results Pending)           Chiquis Howard is a 64 y.o. female with a past medical history of HLD who presented to [facility] ED for complaints of worsening dog bite wound. Patient initially presented to the ED on 1/10 after being bit by a friends dog. She was evaluated and discharged on Augmentin.  Patient reports increasing redness and swelling since yesterday, she became concerned for worsening infection, so she presented to the ED. There are no other complaints at this time. She  denies any fever, chills, malaise.       Assessment & Plan  Cellulitis    #Cellulitis  - worsening dog bite/ concern for increase redness and swelling despite PO Augmentin  - Afebrile, no other symptoms   - LLE XRAY: Soft tissue injury to the calf with soft tissue gas without osseous  injury or radiopaque foreign body evident  - Continue IV Unasyn  -  ID consult     #HLD  -Continue home regimen; Zetia     GI/VTE PPX: PPI, SQ lovenox  Code Status: Full Code     Dr Dee aware of upgrade   Racheal Pratt, APRN-CNP

## 2025-01-17 NOTE — DISCHARGE INSTRUCTIONS
Wound care consult/recommendations:   Left posterior LE- trauma injury from dog bite  Cleanse with Vashe wound cleanser, generously apply vashe to gauze and allow to soak in wound bed for 3-5 minutes, Pat dry.   Apply no sting skin barrier (cavilon) to efren wound   Apply Medihoney ointment to wound bed  Cover with dry dressing   Change every other day and as needed      While in bed patient should only be on one fitted sheet, and one chux. Please, do not use brief while patient is resting in bed. Elevate heels off the bed surface at all times. Turn and reposition at least every 2 hours.

## 2025-01-17 NOTE — CARE PLAN
The clinical goals for the shift include be comfortable    Over the shift, the patient did make progress toward the following goals. Barriers to progression include       Problem: Pain - Adult  Goal: Verbalizes/displays adequate comfort level or baseline comfort level  Outcome: Progressing  Flowsheets (Taken 1/17/2025 1058)  Verbalizes/displays adequate comfort level or baseline comfort level:   Encourage patient to monitor pain and request assistance   Assess pain using appropriate pain scale   Administer analgesics based on type and severity of pain and evaluate response   Implement non-pharmacological measures as appropriate and evaluate response   Consider cultural and social influences on pain and pain management   Notify Licensed Independent Practitioner if interventions unsuccessful or patient reports new pain     Problem: Safety - Adult  Goal: Free from fall injury  Outcome: Progressing  Flowsheets (Taken 1/17/2025 1058)  Free from fall injury: Instruct family/caregiver on patient safety     Problem: Discharge Planning  Goal: Discharge to home or other facility with appropriate resources  Outcome: Progressing  Flowsheets (Taken 1/17/2025 1058)  Discharge to home or other facility with appropriate resources:   Identify barriers to discharge with patient and caregiver   Arrange for needed discharge resources and transportation as appropriate   Identify discharge learning needs (meds, wound care, etc)   Arrange for interpreters to assist at discharge as needed   Refer to discharge planning if patient needs post-hospital services based on physician order or complex needs related to functional status, cognitive ability or social support system     Problem: Chronic Conditions and Co-morbidities  Goal: Patient's chronic conditions and co-morbidity symptoms are monitored and maintained or improved  Outcome: Progressing  Flowsheets (Taken 1/17/2025 1058)  Care Plan - Patient's Chronic Conditions and Co-Morbidity  Symptoms are Monitored and Maintained or Improved:   Monitor and assess patient's chronic conditions and comorbid symptoms for stability, deterioration, or improvement   Collaborate with multidisciplinary team to address chronic and comorbid conditions and prevent exacerbation or deterioration   Update acute care plan with appropriate goals if chronic or comorbid symptoms are exacerbated and prevent overall improvement and discharge     Problem: Skin  Goal: Decreased wound size/increased tissue granulation at next dressing change  Outcome: Progressing  Flowsheets (Taken 1/17/2025 1058)  Decreased wound size/increased tissue granulation at next dressing change:   Promote sleep for wound healing   Protective dressings over bony prominences   Utilize specialty bed per algorithm  Goal: Promote skin healing  Outcome: Progressing  Flowsheets (Taken 1/17/2025 1058)  Promote skin healing:   Assess skin/pad under line(s)/device(s)   Protective dressings over bony prominences   Ensure correct size (line/device) and apply per  instructions   Rotate device position/do not position patient on device

## 2025-01-17 NOTE — PROGRESS NOTES
Pharmacy Medication History Review    Chiquis Howard is a 64 y.o. female admitted for Cellulitis. Pharmacy reviewed the patient's [id number] medications and allergies for accuracy.      Below are additional concerns with the patient's PTA list.  Collected from patient.     The list below reflectives the updated PTA list. Please review each medication in order reconciliation for additional clarification and justification.    Prior to Admission Medications   Prescriptions   amoxicillin-pot clavulanate (Augmentin) 875-125 mg tablet   Sig: Take 1 tablet by mouth every 12 hours for 7 days.   cholecalciferol (Vitamin D-3) 25 MCG (1000 UT) capsule   Sig: Take 1 tablet (50,000 Units) by mouth 1 (one) time per week.   fluticasone (Flonase) 50 mcg/actuation nasal spray   Sig: Administer 1 spray into each nostril 2 times a day. Shake gently. Before first use, prime pump. After use, clean tip and replace cap.   sertraline (Zoloft) 100 mg tablet   Sig: Take 1.5 tablets (150 mg) by mouth once daily.      Facility-Administered Medications: None            Maty Manuel, PharmD

## 2025-01-17 NOTE — CONSULTS
"INFECTIOUS DISEASE INPATIENT INITIAL CONSULTATION    Referred By: Marcelino Flores    Reason For Consult: cellulitis     HPI:  This is a 64 y.o. female with PMH of DLD who presented with lLE cellulitis after dog bite.    Was in ED on 1/10 and given Augmentin. Not better and had worsening redness/swelling. No fever/chills. No other symptoms. No fever here. WBC is normal. Is on IV Unasyn. Seems to be improving now.    Allergies:  Iodinated contrast media and Statins-hmg-coa reductase inhibitors     Vitals (Last 24 Hours):  Heart Rate:  [61-71]   Temp:  [36.6 °C (97.9 °F)-37.2 °C (99 °F)]   Resp:  [15-18]   BP: (130-145)/(78-87)   Height:  [167.6 cm (5' 6\")]   Weight:  [70.3 kg (155 lb)]   SpO2:  [94 %-100 %]      PHYSICAL EXAM:  Gen - NAD  Heart - RRR  LLE - posterior calf with dog bit noted, no fluctuance/drainage, some scabbing over the wound area. Bruising present.  Skin - no rash    MEDS:    Current Facility-Administered Medications:     acetaminophen (Tylenol) tablet 650 mg, 650 mg, oral, q4h PRN **OR** acetaminophen (Tylenol) oral liquid 650 mg, 650 mg, nasogastric tube, q4h PRN **OR** acetaminophen (Tylenol) suppository 650 mg, 650 mg, rectal, q4h PRN, DANYA Castañeda    acetaminophen (Tylenol) tablet 650 mg, 650 mg, oral, q4h PRN **OR** acetaminophen (Tylenol) oral liquid 650 mg, 650 mg, oral, q4h PRN **OR** acetaminophen (Tylenol) suppository 650 mg, 650 mg, rectal, q4h PRN, DANYA Castañeda    ampicillin-sulbactam (Unasyn) 3 g in sodium chloride 0.9%  mL, 3 g, intravenous, q6h, DANYA Castañeda, Last Rate: 200 mL/hr at 01/17/25 0955, 3 g at 01/17/25 0955    diphenhydramine-zinc acetate cream, , Topical, TID PRN, DANYA Castañeda, Given at 01/17/25 0114    enoxaparin (Lovenox) syringe 40 mg, 40 mg, subcutaneous, q24h, DANYA Castañeda, 40 mg at 01/17/25 0953    fluticasone (Flonase) nasal spray 1 spray, 1 spray, Each Nostril, BID PRN, DANYA Castañeda    " ibuprofen tablet 400 mg, 400 mg, oral, q6h TISHA, DANYA Castañeda    polyethylene glycol (Glycolax, Miralax) packet 17 g, 17 g, oral, Daily, DANYA Castañeda    sertraline (Zoloft) tablet 150 mg, 150 mg, oral, Daily, DANYA Castañeda, 150 mg at 01/17/25 0952     LABS:  Lab Results   Component Value Date    WBC 4.3 (L) 01/17/2025    HGB 12.2 01/17/2025    HCT 35.4 (L) 01/17/2025    MCV 86 01/17/2025     01/17/2025      Results from last 72 hours   Lab Units 01/17/25  0610   SODIUM mmol/L 139   POTASSIUM mmol/L 3.6   CHLORIDE mmol/L 108*   CO2 mmol/L 25   BUN mg/dL 14   CREATININE mg/dL 0.55   GLUCOSE mg/dL 88   CALCIUM mg/dL 8.6   ANION GAP mmol/L 10   EGFR mL/min/1.73m*2 >90     Results from last 72 hours   Lab Units 01/17/25  0610   ALK PHOS U/L 45   BILIRUBIN TOTAL mg/dL 0.3   PROTEIN TOTAL g/dL 5.7*   ALT U/L 13   AST U/L 15   ALBUMIN g/dL 3.4     Estimated Creatinine Clearance: 98 mL/min (by C-G formula based on SCr of 0.55 mg/dL).    IMAGING:  X-Ray Left Tib-Fib 1/10  Impression:     Soft tissue injury to the calf with soft tissue gas without osseous  injury or radiopaque foreign body evident.     ASSESSMENT/PLAN:    Left LE Cellulitis after Dog Bite    Seems to be improving with IV Unasyn. I don't feel any abscess on examination. At least 1 more day of IV therapy and we might be able to go back to PO Augmentin tomorrow.    Monitoring for adverse effects of abx such as rash/itching/diarrhea.     Will follow. Thanks! D/w primary team    Be Moy MD  [facility]  Office #[phone]

## 2025-01-17 NOTE — CARE PLAN
The clinical goals for the shift include be comfortable    Problem: Pain - Adult  Goal: Verbalizes/displays adequate comfort level or baseline comfort level  Outcome: Progressing     Problem: Safety - Adult  Goal: Free from fall injury  Outcome: Progressing     Problem: Discharge Planning  Goal: Discharge to home or other facility with appropriate resources  Outcome: Progressing     Problem: Chronic Conditions and Co-morbidities  Goal: Patient's chronic conditions and co-morbidity symptoms are monitored and maintained or improved  Outcome: Progressing     Problem: Skin  Goal: Decreased wound size/increased tissue granulation at next dressing change  Outcome: Progressing  Goal: Promote skin healing  Outcome: Progressing        Detail Level: Zone Plan: At present, no signs of active infection/ need for I&D.  Per pt, area previously became erythematous and edematous; rx doxy 100 mg BID x 10 days sent in but patient only picked up 5 days. Will give samples for the next 5 days of therapy. Aware to use cool compresses and elevate leg when possible. Follow up in 4 weeks. Samples Given: Doryx 200 mg: take 1/2 tablet by mouth twice daily for 6 days.

## 2025-01-17 NOTE — PROGRESS NOTES
"Chiquis Howard is a 64 y.o. female on day 0 of admission presenting with Cellulitis.    Subjective   64-year-old female with no significant past medical history was in the hospital couple of days ago for dog bite and was treated with Augmentin and discharged home.  However patient presented back to emergency room late last night stating that her left leg cellulitis was getting worse therefore she was admitted for IV antibiotic treatment.  And since then she has been started on Unasyn and has been seen by infectious disease who concurred with the antibiotic choice.  Her blood cultures have been negative.         Objective     Physical Exam  GENERAL:  Alert, moderate distress, cooperative  SKIN:  Skin color, texture, turgor normal. No rashes or lesions.  Dog bite left calf with surrounding induration and cellulitis.  OROPHARYNX:  Lips, mucosa, and tongue are normal.Teeth and gums, normal. Oropharynx normal.  NECK:  No jugulovenous distention, No carotid bruits, Carotid pulse normal contour, Supple  LUNGS:  Lungs clear to auscultation. Good diaphragmatic excursion.  CARDIAC: Rate and rhythm is regular.  Normal S1 and S2; no rubs, murmurs, or gallops  ABDOMEN:  Abdomen soft, non-tender, BS normal, No masses or organomegaly  EXTREMETIES:  Extremities normal, no deformities, edema, clubbing or skin discoloration. Good capillary refill., No ulcers  NEURO:  Alert, oriented X 3, Gait normal. Non-focal. Reflexes normal and symmetric. Sensation grossly intact., Cranial nerves II-XII intact  PULSES:  2+ radial, 2+ carotid    Last Recorded Vitals  Blood pressure 125/63, pulse 67, temperature 36.3 °C (97.3 °F), temperature source Temporal, resp. rate 17, height 1.676 m (5' 6\"), weight 70.3 kg (155 lb), SpO2 92%.  Intake/Output last 3 Shifts:  I/O last 3 completed shifts:  In: 200 (2.8 mL/kg) [IV Piggyback:200]  Out: - (0 mL/kg)   Weight: 70.3 kg     Relevant Results  Scheduled medications  ampicillin-sulbactam, 3 g, intravenous, " q6h  enoxaparin, 40 mg, subcutaneous, q24h  polyethylene glycol, 17 g, oral, Daily  sertraline, 150 mg, oral, Daily      Continuous medications     PRN medications  PRN medications: acetaminophen **OR** acetaminophen **OR** acetaminophen, acetaminophen **OR** acetaminophen **OR** acetaminophen, diphenhydramine-zinc acetate, fluticasone         Results for orders placed or performed during the hospital encounter of 01/16/25 (from the past 96 hours)   CBC and Auto Differential   Result Value Ref Range    WBC 4.7 4.4 - 11.3 x10*3/uL    nRBC 0.0 0.0 - 0.0 /100 WBCs    RBC 4.73 4.00 - 5.20 x10*6/uL    Hemoglobin 14.0 12.0 - 16.0 g/dL    Hematocrit 41.7 36.0 - 46.0 %    MCV 88 80 - 100 fL    MCH 29.6 26.0 - 34.0 pg    MCHC 33.6 32.0 - 36.0 g/dL    RDW 12.8 11.5 - 14.5 %    Platelets 183 150 - 450 x10*3/uL    Neutrophils % 57.2 40.0 - 80.0 %    Immature Granulocytes %, Automated 0.6 0.0 - 0.9 %    Lymphocytes % 28.1 13.0 - 44.0 %    Monocytes % 9.0 2.0 - 10.0 %    Eosinophils % 4.7 0.0 - 6.0 %    Basophils % 0.4 0.0 - 2.0 %    Neutrophils Absolute 2.67 1.20 - 7.70 x10*3/uL    Immature Granulocytes Absolute, Automated 0.03 0.00 - 0.70 x10*3/uL    Lymphocytes Absolute 1.31 1.20 - 4.80 x10*3/uL    Monocytes Absolute 0.42 0.10 - 1.00 x10*3/uL    Eosinophils Absolute 0.22 0.00 - 0.70 x10*3/uL    Basophils Absolute 0.02 0.00 - 0.10 x10*3/uL   Comprehensive metabolic panel   Result Value Ref Range    Glucose 100 (H) 74 - 99 mg/dL    Sodium 139 136 - 145 mmol/L    Potassium 3.8 3.5 - 5.3 mmol/L    Chloride 106 98 - 107 mmol/L    Bicarbonate 26 21 - 32 mmol/L    Anion Gap 11 10 - 20 mmol/L    Urea Nitrogen 12 6 - 23 mg/dL    Creatinine 0.57 0.50 - 1.05 mg/dL    eGFR >90 >60 mL/min/1.73m*2    Calcium 9.3 8.6 - 10.3 mg/dL    Albumin 4.4 3.4 - 5.0 g/dL    Alkaline Phosphatase 53 33 - 136 U/L    Total Protein 6.8 6.4 - 8.2 g/dL    AST 18 9 - 39 U/L    Bilirubin, Total 0.3 0.0 - 1.2 mg/dL    ALT 17 7 - 45 U/L   Lactate   Result Value  Ref Range    Lactate 0.9 0.4 - 2.0 mmol/L   CBC   Result Value Ref Range    WBC 4.3 (L) 4.4 - 11.3 x10*3/uL    nRBC 0.0 0.0 - 0.0 /100 WBCs    RBC 4.12 4.00 - 5.20 x10*6/uL    Hemoglobin 12.2 12.0 - 16.0 g/dL    Hematocrit 35.4 (L) 36.0 - 46.0 %    MCV 86 80 - 100 fL    MCH 29.6 26.0 - 34.0 pg    MCHC 34.5 32.0 - 36.0 g/dL    RDW 12.8 11.5 - 14.5 %    Platelets 153 150 - 450 x10*3/uL   Comprehensive metabolic panel   Result Value Ref Range    Glucose 88 74 - 99 mg/dL    Sodium 139 136 - 145 mmol/L    Potassium 3.6 3.5 - 5.3 mmol/L    Chloride 108 (H) 98 - 107 mmol/L    Bicarbonate 25 21 - 32 mmol/L    Anion Gap 10 10 - 20 mmol/L    Urea Nitrogen 14 6 - 23 mg/dL    Creatinine 0.55 0.50 - 1.05 mg/dL    eGFR >90 >60 mL/min/1.73m*2    Calcium 8.6 8.6 - 10.3 mg/dL    Albumin 3.4 3.4 - 5.0 g/dL    Alkaline Phosphatase 45 33 - 136 U/L    Total Protein 5.7 (L) 6.4 - 8.2 g/dL    AST 15 9 - 39 U/L    Bilirubin, Total 0.3 0.0 - 1.2 mg/dL    ALT 13 7 - 45 U/L   XR tibia fibula left 2 views    Result Date: 1/10/2025  Interpreted By:  Jamel Rice, STUDY: XR TIBIA FIBULA LEFT 2 VIEWS; 1/10/2025 8:03 pm   INDICATION: Signs/Symptoms:dog bite.   COMPARISON: None.   ACCESSION NUMBER(S): [id number]   ORDERING CLINICIAN: ISAIAH SCHMIDT   TECHNIQUE: Two views of the left tibia and fibula.   FINDINGS: No fracture or dislocation is evident. There is complex soft tissue injury to the calf with multiple regions of soft tissue gas including some intramuscular gas in the calf musculature. There appears to be some gauze in the superficial aspect of the calf wound. Given this, no radiopaque foreign body is evident.       Soft tissue injury to the calf with soft tissue gas without osseous injury or radiopaque foreign body evident.   Signed by: Jamel Rice 1/10/2025 8:17 PM Dictation workstation:   [id number]                   Assessment/Plan   Assessment & Plan  Cellulitis  64-year-old lady admitted with dog bite left calf leading to  cellulitis at the dog bite site and failed outpatient treatment with Augmentin.  She has been started on IV Unasyn and seen by infectious disease who concurred with the current treatment.  Also seen by wound care.  Will continue Unasyn for another 24 hours and possibly discharge home tomorrow on oral Augmentin.       I spent 35 minutes in the professional and overall care of this patient.      Sebas Dee MD

## 2025-01-17 NOTE — ED PROVIDER NOTES
"History/Exam limitations: {limitations:27118::\"none\"}.   Additional history was obtained from {info source:84401}.          HPI:    Chiquis Howard is a 64 y.o. female presenting for evaluation of worsening redness and swelling to the right posterior leg after recent dog bite.  Patient states that 6 days ago she was bit by a dog, was a friend's dog and dog is vaccinated.  Saw immediate care and was placed on Augmentin.  A few sutures were placed to approximate the no significant portions of the wound.  She states that it continued draining and was feeling improved however in the last day he has worsening she has worsening surrounding redness, discomfort.  No fever.    Compartments soft, no purulent drainage, bedside ultrasound with no drainable fluid collections      Physical Exam:  ED Triage Vitals [01/16/25 1352]   Temperature Heart Rate Respirations BP   37.2 °C (99 °F) 65 15 130/87      Pulse Ox Temp src Heart Rate Source Patient Position   100 % -- -- --      BP Location FiO2 (%)     -- --        GEN:      Alert, NAD  Eyes:       PERRL, EOMI  HENT:      NC/AT, OP clear, airway patent, MM  CV:      RRR, no MRG, no LE pitting edema, 2+ radial and pedal pulses  PULM:     CTAB, no w/r/r, easy WOB, symmetric chest rise  ABD:      Soft, NT, ND, no masses, BS +  :       No CVA TTP  NEURO:   A&Ox3, no focal deficits ***   MSK:      FROM, no joint deformities or swelling, no e/o trauma  SKIN:       Warm and dry  PSYCH:    Appropriate mood and affect         MDM/ED Course:   ***            Chronic medical conditions affecting care listed in MDM. I independently reviewed imaging studies and agreed with radiology reads. I reviewed recent and relevant outside records including PCP notes, Prior discharge summaries, and prior radiology reports.    Procedure  Procedures    Diagnosis:   1. ***    Dispo:  *** in stable condition      Disclaimer: Portions of this note were dictated by speech recognition. An attempt at proof " reading was made to minimize errors. Minor errors in transcription may be present.  Please call if questions.

## 2025-01-17 NOTE — CONSULTS
Wound Care Consult     Visit Date: 1/17/2025      Patient Name: Chiquis Howard         MRN: [id number]           YOB: 1960     Reason for Consult: Left postierior LE         Wound History: dog bite      Pertinent Labs:   Albumin   Date Value Ref Range Status   01/17/2025 3.4 3.4 - 5.0 g/dL Final       Wound Assessment:  Wound 01/17/25 Traumatic Tibial Dorsal;Left (Active)   Site Assessment Webster;Yellow;Brown 01/17/25 1113   Cris-Wound Assessment Indurated;Pink;Red 01/17/25 1113   Shape irregular 01/17/25 1113   Wound Length (cm) 6.4 cm 01/17/25 1113   Wound Width (cm) 7.3 cm 01/17/25 1113   Wound Surface Area (cm^2) 46.72 cm^2 01/17/25 1113   Margins Well-defined edges 01/17/25 1113       Wound Team Summary Assessment: Notified Racheal Pratt of wound care recommendations. Additional supplies left at bedside.     Left posterior LE- trauma injury from dog bite  Cleanse with Vashe wound cleanser, generously apply vashe to gauze and allow to soak in wound bed for 3-5 minutes, Pat dry.   Apply no sting skin barrier (cavilon) to cris wound   Apply Medihoney ointment to wound bed  Cover with dry dressing   Change every other day and as needed     While in bed patient should only be on one fitted sheet, and one chux. Please, do not use brief while patient is resting in bed. Elevate heels off the bed surface at all times. Turn and reposition at least every 2 hours.       Wound Team Plan: Thank you for this consult. Assessment has been completed and orders have been placed. Wound care to be completed by nursing per orders. Please place new consult if there is a change in wound status.        Desiree Jones RN BSN,[facility]  [phone]   1/17/2025  6:07 PM

## 2025-01-18 VITALS
BODY MASS INDEX: 24.91 KG/M2 | SYSTOLIC BLOOD PRESSURE: 130 MMHG | HEART RATE: 70 BPM | HEIGHT: 66 IN | WEIGHT: 155 LBS | TEMPERATURE: 98.2 F | OXYGEN SATURATION: 94 % | DIASTOLIC BLOOD PRESSURE: 72 MMHG | RESPIRATION RATE: 12 BRPM

## 2025-01-18 PROCEDURE — 2500000002 HC RX 250 W HCPCS SELF ADMINISTERED DRUGS (ALT 637 FOR MEDICARE OP, ALT 636 FOR OP/ED)

## 2025-01-18 PROCEDURE — 2500000004 HC RX 250 GENERAL PHARMACY W/ HCPCS (ALT 636 FOR OP/ED)

## 2025-01-18 PROCEDURE — 99238 HOSP IP/OBS DSCHRG MGMT 30/<: CPT | Performed by: NURSE PRACTITIONER

## 2025-01-18 RX ORDER — ACETAMINOPHEN 325 MG/1
650 TABLET ORAL EVERY 4 HOURS PRN
Start: 2025-01-18

## 2025-01-18 RX ORDER — AMOXICILLIN AND CLAVULANATE POTASSIUM 875; 125 MG/1; MG/1
875 TABLET, FILM COATED ORAL EVERY 12 HOURS
Qty: 14 TABLET | Refills: 0 | Status: SHIPPED | OUTPATIENT
Start: 2025-01-18 | End: 2025-01-25

## 2025-01-18 RX ADMIN — AMPICILLIN SODIUM AND SULBACTAM SODIUM 3 G: 2; 1 INJECTION, POWDER, FOR SOLUTION INTRAMUSCULAR; INTRAVENOUS at 04:52

## 2025-01-18 RX ADMIN — AMPICILLIN SODIUM AND SULBACTAM SODIUM 3 G: 2; 1 INJECTION, POWDER, FOR SOLUTION INTRAMUSCULAR; INTRAVENOUS at 09:07

## 2025-01-18 RX ADMIN — SERTRALINE HYDROCHLORIDE 150 MG: 50 TABLET ORAL at 09:07

## 2025-01-18 ASSESSMENT — COGNITIVE AND FUNCTIONAL STATUS - GENERAL
DAILY ACTIVITIY SCORE: 24
MOBILITY SCORE: 24

## 2025-01-18 ASSESSMENT — PAIN SCALES - GENERAL
PAINLEVEL_OUTOF10: 0 - NO PAIN
PAINLEVEL_OUTOF10: 0 - NO PAIN

## 2025-01-18 NOTE — CARE PLAN
The patient's goals for the shift include      The clinical goals for the shift include be comfortable    .

## 2025-01-18 NOTE — PROGRESS NOTES
"  INFECTIOUS DISEASE DAILY PROGRESS NOTE    SUBJECTIVE:    No overnight events. No new complaints. Afebrile. No rash/itching/diarrhea.    OBJECTIVE:  VITALS (Last 24 Hours)  /72 (BP Location: Left arm, Patient Position: Sitting)   Pulse 70   Temp 36.8 °C (98.2 °F) (Temporal)   Resp 12   Ht 1.676 m (5' 6\")   Wt 70.3 kg (155 lb)   SpO2 94%   BMI 25.02 kg/m²     PHYSICAL EXAM:  Gen - NAD  Heart - RRR  LLE - posterior calf cellulitis is improving, no signs of abscess  Skin - no rash    ABX: IV Unasyn    LABS:  Lab Results   Component Value Date    WBC 4.3 (L) 01/17/2025    HGB 12.2 01/17/2025    HCT 35.4 (L) 01/17/2025    MCV 86 01/17/2025     01/17/2025     Lab Results   Component Value Date    GLUCOSE 88 01/17/2025    CALCIUM 8.6 01/17/2025     01/17/2025    K 3.6 01/17/2025    CO2 25 01/17/2025     (H) 01/17/2025    BUN 14 01/17/2025    CREATININE 0.55 01/17/2025     Results from last 72 hours   Lab Units 01/17/25  0610   ALK PHOS U/L 45   BILIRUBIN TOTAL mg/dL 0.3   PROTEIN TOTAL g/dL 5.7*   ALT U/L 13   AST U/L 15   ALBUMIN g/dL 3.4     Estimated Creatinine Clearance: 98 mL/min (by C-G formula based on SCr of 0.55 mg/dL).    ASSESSMENT/PLAN:     Left LE Cellulitis after Dog Bite - resolving     OK to go out with PO Augmentin 875mg BID for 7 more days - I sent script.    Monitoring for adverse effects of abx such as rash/itching/diarrhea - none.    Will sign off. Please call back with questions. Thanks! D/w primary team    Be Moy MD  [facility]  Office #[phone]      "

## 2025-01-18 NOTE — DISCHARGE SUMMARY
Discharge Diagnosis  Cellulitis    Issues Requiring Follow-Up  Follow up with PCP    Hospital Course  History Of Present Illness  Chiquis Howard is a 64 y.o. female with a past medical history of HLD who presented to [facility] ED for complaints of worsening dog bite wound. Patient initially presented to the ED on 1/10 after being bit by a friends dog. She was evaluated and discharged on Augmentin.  Patient reports increasing redness and swelling since yesterday, she became concerned for worsening infection, so she presented to the ED. There are no other complaints at this time. She denies any fever, chills, malaise.     ED work up:  Vital signs:37.2 (99), HR 65, RR 15, /87, 100% on room air  Labs: No abnormalities noted  Imaging: LLE XRAY: Soft tissue injury to the calf with soft tissue gas without osseous injury or radiopaque foreign body evident    ED medications administered:  IV Unasyn x1      Allergies  Iodinated contrast media and Statins-hmg-coa reductase inhibitors       Assessment/Plan   Assessment & Plan  Cellulitis    Chiquis Howard is a 64 y.o. female with a past medical history of HLD who presented to [facility] ED for complaints of worsening dog bite wound. Patient initially presented to the ED on 1/10 after being bit by a friends dog. She was evaluated and discharged on Augmentin.  Patient reports increasing redness and swelling since yesterday, she became concerned for worsening infection, so she presented to the ED. There are no other complaints at this time. She denies any fever, chills, malaise.     #Cellulitis  - worsening dog bite/ concern for increase redness and swelling despite PO Augmentin  - Afebrile, no other symptoms   - LLE XRAY: Soft tissue injury to the calf with soft tissue gas without osseous  injury or radiopaque foreign body evident  - Continue IV Unasyn  - Consider ID and/or Podiatry consultation- AM Team to determine based on progression of  infection    #HLD  -Continue home regimen; Zetia     GI/VTE PPX: PPI, SQ lovenox  Code Status: Full Code    Chart, medical history, and labs/testing reviewed in detail.   Case and plan of care to be discussed with attending provider.      Disposition: Discharge home once medically cleared and stable, pending symptom improvement    COLLIN Castañeda-CNP   Observation/Internal Med [place]  [facility]  01/17/25  12:02 AM  Total time of 45 minutes spent on professional and overall care, with >50% of time dedicated to counseling/coordination of care.      Pertinent Physical Exam At Time of Discharge  Physical Exam  Constitutional:       Appearance: Normal appearance.   HENT:      Mouth/Throat:      Mouth: Mucous membranes are moist.   Pulmonary:      Effort: Pulmonary effort is normal.   Musculoskeletal:         General: Normal range of motion.   Neurological:      General: No focal deficit present.      Mental Status: She is alert and oriented to person, place, and time.         Home Medications     Medication List      START taking these medications     acetaminophen 325 mg tablet; Commonly known as: Tylenol; Take 2 tablets   (650 mg) by mouth every 4 hours if needed for fever (temp greater than   38.0 C) or moderate pain (4 - 6) (greater than or equal to 38 C).     CONTINUE taking these medications     amoxicillin-pot clavulanate 875-125 mg tablet; Commonly known as:   Augmentin; Take 1 tablet (875 mg) by mouth every 12 hours for 7 days.   cholecalciferol 1,250 mcg (50,000 unit) tablet; Commonly known as:   Vitamin D3   fluticasone 50 mcg/actuation nasal spray; Commonly known as: Flonase;   Administer 1 spray into each nostril 2 times a day. Shake gently. Before   first use, prime pump. After use, clean tip and replace cap.   sertraline 100 mg tablet; Commonly known as: Zoloft; Take 1.5 tablets   (150 mg) by mouth once daily.       Outpatient Follow-Up  Future Appointments   Date Time Provider Department  [place]   1/20/2025 12:00 PM Sara Ma MD [id number] [facility]   3/5/2025  4:00 PM Sara Ma MD [id number] [facility]   6/19/2025  8:20 AM COLLIN Arana-CNP [id number] [facility]       COLLIN Manley-CNP  Discussed with ID

## 2025-01-18 NOTE — CARE PLAN
The patient's goals for the shift include remain free of falls    The clinical goals for the shift include be comfortable      Problem: Safety - Adult  Goal: Free from fall injury  Outcome: Progressing

## 2025-01-20 ENCOUNTER — APPOINTMENT (OUTPATIENT)
Dept: PRIMARY CARE | Facility: CLINIC | Age: 65
End: 2025-01-20
Payer: COMMERCIAL

## 2025-01-20 ENCOUNTER — PATIENT OUTREACH (OUTPATIENT)
Dept: PRIMARY CARE | Facility: CLINIC | Age: 65
End: 2025-01-20

## 2025-01-20 NOTE — PROGRESS NOTES
Discharge Facility: [facility]   Discharge Diagnosis: Cellulitis   Admission Date: 1/17/25  Discharge Date: 1/18/25    PCP Appointment Date: patient declines, plans to follow up if needed.  Specialist Appointment Date: n/a  Hospital Encounter and Summary Linked: Yes  See discharge assessment below for further details.    Medications  Medications reviewed with patient/caregiver?: Yes (1/20/2025  4:03 PM)  Is the patient having any side effects they believe may be caused by any medication additions or changes?: No (1/20/2025  4:03 PM)  Does the patient have all medications ordered at discharge?: Yes (1/20/2025  4:03 PM)  Prescription Comments: START taking:  acetaminophen (1/20/2025  4:03 PM)  Medication Comments: denies questions or concerns (1/20/2025  4:03 PM)    Appointments  Does the patient have a primary care provider?: Yes (1/20/2025  4:03 PM)  Care Management Interventions: -- (pt declines) (1/20/2025  4:03 PM)    Self Management  What is the home health agency?: n/a (1/20/2025  4:03 PM)  What Durable Medical Equipment (DME) was ordered?: wound care supplies (1/20/2025  4:03 PM)    Patient Teaching  Does the patient have access to their discharge instructions?: Yes (1/20/2025  4:03 PM)  Care Management Interventions: Reviewed instructions with patient (1/20/2025  4:03 PM)  What is the patient's perception of their health status since discharge?: Improving (1/20/2025  4:03 PM)  Patient/Caregiver Education Comments: Patient reports doing well, very happy with state of her wound since discharge and change in wound care products. She continues on previous antibiotics. She declines follow up with PCP at this time, states she will call if needed. Provided patient contact information. (1/20/2025  4:03 PM)

## 2025-02-06 ENCOUNTER — APPOINTMENT (OUTPATIENT)
Dept: PRIMARY CARE | Facility: CLINIC | Age: 65
End: 2025-02-06
Payer: COMMERCIAL

## 2025-02-11 DIAGNOSIS — F33.1 MODERATE RECURRENT MAJOR DEPRESSION: ICD-10-CM

## 2025-02-11 DIAGNOSIS — F41.9 ANXIETY: ICD-10-CM

## 2025-02-11 RX ORDER — SERTRALINE HYDROCHLORIDE 100 MG/1
150 TABLET, FILM COATED ORAL DAILY
Qty: 135 TABLET | Refills: 0 | Status: SHIPPED | OUTPATIENT
Start: 2025-02-11

## 2025-03-05 ENCOUNTER — APPOINTMENT (OUTPATIENT)
Dept: PRIMARY CARE | Facility: CLINIC | Age: 65
End: 2025-03-05
Payer: COMMERCIAL

## 2025-05-15 LAB
25(OH)D3+25(OH)D2 SERPL-MCNC: 107 NG/ML (ref 30–100)
ALBUMIN SERPL-MCNC: 4.3 G/DL (ref 3.6–5.1)
ALP SERPL-CCNC: 49 U/L (ref 37–153)
ALT SERPL-CCNC: 14 U/L (ref 6–29)
ANION GAP SERPL CALCULATED.4IONS-SCNC: 9 MMOL/L (CALC) (ref 7–17)
AST SERPL-CCNC: 17 U/L (ref 10–35)
BILIRUB SERPL-MCNC: 0.3 MG/DL (ref 0.2–1.2)
BUN SERPL-MCNC: 17 MG/DL (ref 7–25)
CALCIUM SERPL-MCNC: 9.4 MG/DL (ref 8.6–10.4)
CHLORIDE SERPL-SCNC: 105 MMOL/L (ref 98–110)
CHOLEST SERPL-MCNC: 260 MG/DL
CHOLEST/HDLC SERPL: 4.8 (CALC)
CO2 SERPL-SCNC: 27 MMOL/L (ref 20–32)
CREAT SERPL-MCNC: 0.68 MG/DL (ref 0.5–1.05)
EGFRCR SERPLBLD CKD-EPI 2021: 98 ML/MIN/1.73M2
ERYTHROCYTE [DISTWIDTH] IN BLOOD BY AUTOMATED COUNT: 13 % (ref 11–15)
EST. AVERAGE GLUCOSE BLD GHB EST-MCNC: 105 MG/DL
EST. AVERAGE GLUCOSE BLD GHB EST-SCNC: 5.8 MMOL/L
GLUCOSE SERPL-MCNC: 90 MG/DL (ref 65–99)
HBA1C MFR BLD: 5.3 %
HCT VFR BLD AUTO: 43.2 % (ref 35–45)
HDLC SERPL-MCNC: 54 MG/DL
HGB BLD-MCNC: 14 G/DL (ref 11.7–15.5)
LDLC SERPL CALC-MCNC: 178 MG/DL (CALC)
MCH RBC QN AUTO: 29.9 PG (ref 27–33)
MCHC RBC AUTO-ENTMCNC: 32.4 G/DL (ref 32–36)
MCV RBC AUTO: 92.1 FL (ref 80–100)
NONHDLC SERPL-MCNC: 206 MG/DL (CALC)
PLATELET # BLD AUTO: 182 THOUSAND/UL (ref 140–400)
PMV BLD REES-ECKER: 11.7 FL (ref 7.5–12.5)
POTASSIUM SERPL-SCNC: 4.2 MMOL/L (ref 3.5–5.3)
PROT SERPL-MCNC: 6.5 G/DL (ref 6.1–8.1)
RBC # BLD AUTO: 4.69 MILLION/UL (ref 3.8–5.1)
SODIUM SERPL-SCNC: 141 MMOL/L (ref 135–146)
TRIGL SERPL-MCNC: 141 MG/DL
TSH SERPL-ACNC: 2.01 MIU/L (ref 0.4–4.5)
VIT B12 SERPL-MCNC: 639 PG/ML (ref 200–1100)
WBC # BLD AUTO: 4.1 THOUSAND/UL (ref 3.8–10.8)

## 2025-05-19 ENCOUNTER — APPOINTMENT (OUTPATIENT)
Dept: PRIMARY CARE | Facility: CLINIC | Age: 65
End: 2025-05-19
Payer: COMMERCIAL

## 2025-05-19 VITALS
DIASTOLIC BLOOD PRESSURE: 79 MMHG | HEART RATE: 70 BPM | OXYGEN SATURATION: 95 % | BODY MASS INDEX: 22.82 KG/M2 | HEIGHT: 66 IN | SYSTOLIC BLOOD PRESSURE: 115 MMHG | WEIGHT: 142 LBS

## 2025-05-19 DIAGNOSIS — Z00.00 ANNUAL PHYSICAL EXAM: Primary | ICD-10-CM

## 2025-05-19 DIAGNOSIS — Z12.31 VISIT FOR SCREENING MAMMOGRAM: ICD-10-CM

## 2025-05-19 DIAGNOSIS — E78.41 ELEVATED LIPOPROTEIN(A): ICD-10-CM

## 2025-05-19 DIAGNOSIS — F41.9 ANXIETY: ICD-10-CM

## 2025-05-19 DIAGNOSIS — F33.1 MODERATE RECURRENT MAJOR DEPRESSION: ICD-10-CM

## 2025-05-19 PROBLEM — N95.8 OTHER SPECIFIED MENOPAUSAL AND PERIMENOPAUSAL DISORDERS: Status: RESOLVED | Noted: 2023-10-09 | Resolved: 2025-05-19

## 2025-05-19 PROBLEM — E55.9 VITAMIN D DEFICIENCY: Status: RESOLVED | Noted: 2023-10-09 | Resolved: 2025-05-19

## 2025-05-19 PROBLEM — L03.90 CELLULITIS: Status: RESOLVED | Noted: 2025-01-16 | Resolved: 2025-05-19

## 2025-05-19 PROCEDURE — 1036F TOBACCO NON-USER: CPT | Performed by: FAMILY MEDICINE

## 2025-05-19 PROCEDURE — 3008F BODY MASS INDEX DOCD: CPT | Performed by: FAMILY MEDICINE

## 2025-05-19 PROCEDURE — 99396 PREV VISIT EST AGE 40-64: CPT | Performed by: FAMILY MEDICINE

## 2025-05-19 RX ORDER — EZETIMIBE 10 MG/1
10 TABLET ORAL DAILY
Qty: 90 TABLET | Refills: 1 | Status: SHIPPED | OUTPATIENT
Start: 2025-05-19

## 2025-05-19 RX ORDER — SERTRALINE HYDROCHLORIDE 100 MG/1
100 TABLET, FILM COATED ORAL DAILY
Qty: 90 TABLET | Refills: 3 | Status: SHIPPED | OUTPATIENT
Start: 2025-05-19

## 2025-05-19 ASSESSMENT — PATIENT HEALTH QUESTIONNAIRE - PHQ9
2. FEELING DOWN, DEPRESSED OR HOPELESS: NOT AT ALL
1. LITTLE INTEREST OR PLEASURE IN DOING THINGS: NOT AT ALL
SUM OF ALL RESPONSES TO PHQ9 QUESTIONS 1 AND 2: 0

## 2025-05-19 NOTE — PROGRESS NOTES
Subjective   Patient ID: Chiquis Howard is a 64 y.o. female who presents for Annual Exam.    Last Annual Physical: Feb 2024  Last Dental Visit: 6 months ago  Last Eye exam: a year ago  Hearing Concerns: yes, hearing test done within the past year  Diet: eats protein  Exercise Routine: Regular       HPI   The patient is intolerant of atorvastatin or simvastatin and is taking vit D, Zoloft for anxiety and depression as controlled and is tolerating them well. She mentions she is on tirzepatide. She is retiring in July 2025.       She had an ED visit on 2/5/2025 for a wound infection from 2 dog bites that she sustained on 1/10. She has already been on significant course of antibiotics and has seen Wound Care.  She mentions she wakes up sometimes at the middle of the night now with pain in the wound site.    Blood pressure is well controlled at this time. Denies any falls in the last year. No depression over the last few weeks; however, feels low sometimes due to stress at work.  She has been having right upper quadrant pain possibly from gastritis.   Recommend taking Tums as needed.  Also, has a cough that has been persistent over 3 months.      Labs reviewed.  Vitamin D is high. LDL still high at 178 from prior 182.  Other labs were normal.  She mentions she is taking 1500 units of vit D but not regularly.       Review of Systems  Constitutional: No fever or chills, No Night Sweats  Eyes: No Blurry Vision or Eye sight problems  ENT: + Nasal Discharge, Hoarseness, sore throat  Cardiovascular: no chest pain, no palpitations and no syncope.   Respiratory: + cough, no shortness of breath during exertion and no shortness of breath at rest.   Gastrointestinal: + abdominal pain, + nausea and no vomiting.   : No vaginal discharge, burning with urination, no blood in urine or stools  Skin: No Skin rashes or Lesions +hives  Neuro: No Headache, no dizziness or Numbness or tingling  Psych: + Anxiety, depression or sleeping  "problems  Heme: No Easy bleeding + bruising.     Objective   /79   Pulse 70   Ht 1.67 m (5' 5.75\")   Wt 64.4 kg (142 lb)   SpO2 95%   BMI 23.09 kg/m²     Physical Exam  Constitutional: Alert and in no acute distress. Well developed, well nourished.   Head and Face: Head and face: Normal.    Eyes: Normal external exam. Pupils were equal in size, round, reactive to light (PERRL) with normal accommodation and extraocular movements intact (EOMI).   Ears, Nose, Mouth, and Throat: External inspection of ears and nose: Normal.  Hearing: Normal.  Nasal mucosa, septum, and turbinates: Normal.  Lips, teeth, and gums: Normal.  Oropharynx: Normal.   Neck: No neck mass was observed. Supple. Thyroid not enlarged and there were no palpable thyroid nodules.   Cardiovascular: Heart rate and rhythm were normal, normal S1 and S2. Pedal pulses: Normal. No peripheral edema.   Pulmonary: No respiratory distress. Clear bilateral breath sounds.   Abdomen: Soft nontender; no abdominal mass palpated. Normal bowel sounds. No organomegaly.   : Deferred   Musculoskeletal: No joint swelling seen, normal movements of all extremities. Range of motion: Normal.  Muscle strength/tone: Normal.    Skin: Normal skin color and pigmentation, normal skin turgor, and no rash.   Neurologic: Deep tendon reflexes were 2+ and symmetric.   Psychiatric: Judgment and insight: Intact. Mood and affect: Normal.  Lymphatic: No cervical lymphadenopathy. Palpation of lymph nodes in axillae: Normal.  Palpation of lymph nodes in groin: Normal.    Lab Results   Component Value Date    WBC 4.1 05/14/2025    HGB 14.0 05/14/2025    HCT 43.2 05/14/2025     05/14/2025    CHOL 260 (H) 05/14/2025    TRIG 141 05/14/2025    HDL 54 05/14/2025    ALT 14 05/14/2025    AST 17 05/14/2025     05/14/2025    K 4.2 05/14/2025     05/14/2025    CREATININE 0.68 05/14/2025    BUN 17 05/14/2025    CO2 27 05/14/2025    TSH 2.01 05/14/2025    HGBA1C 5.3 05/14/2025 "       XR tibia fibula left 2 views  Narrative: Interpreted By:  Jamel Rice,   STUDY:  XR TIBIA FIBULA LEFT 2 VIEWS; 1/10/2025 8:03 pm      INDICATION:  Signs/Symptoms:dog bite.      COMPARISON:  None.      ACCESSION NUMBER(S):  [id number]      ORDERING CLINICIAN:  ISAIAH SCHMIDT      TECHNIQUE:  Two views of the left tibia and fibula.      FINDINGS:  No fracture or dislocation is evident. There is complex soft tissue  injury to the calf with multiple regions of soft tissue gas including  some intramuscular gas in the calf musculature. There appears to be  some gauze in the superficial aspect of the calf wound. Given this,  no radiopaque foreign body is evident.      Impression: Soft tissue injury to the calf with soft tissue gas without osseous  injury or radiopaque foreign body evident.      Signed by: Jamel Rice 1/10/2025 8:17 PM  Dictation workstation:   [id number]      Assessment/Plan   Diagnoses and all orders for this visit:  Annual physical exam  Elevated lipoprotein(a)  -     Follow Up In [facility] - PCP - Health Maintenance  -     ezetimibe (Zetia) 10 mg tablet; Take 1 tablet (10 mg) by mouth once daily.  -     Lipid Panel; Future  -     Comprehensive Metabolic Panel; Future  Moderate recurrent major depression  -     sertraline (Zoloft) 100 mg tablet; Take 1 tablet (100 mg) by mouth once daily.  Anxiety  -     sertraline (Zoloft) 100 mg tablet; Take 1 tablet (100 mg) by mouth once daily.  Visit for screening mammogram  -     BI mammo bilateral screening tomosynthesis; Future        Dear Chiquis Howard     It was my pleasure to take care of you today in the office. Below are the things we discussed today:    1. 1. Immunizations: Yearly Flu shot is recommended. Up-to-date         a: COVID: Please get booster from the pharmacy          b: Tetanus: Up-to-date. Last done in 2025         c: Shingrix: The patient will come back for it   D. Prevnar: Declined     2. Blood Work: Reviewed  3.  Seen your dentist twice a year  4. Yearly Eye exam is recommended     5. BMI: Normal  6: Diet recommendations:   Eat Clean, Try to have as many home cooked meals as possible  Avoid processed foods which contain excess calories, sugar, and sodium.     7. Exercise recommendations:   150 minutes a week to maintain your weight      If you have to loose weight, you need a better diet and exercise plan.      8. Supplements recommended:  a - Calcium 600 mg up to twice a day to get a total of 1200 mg. Each 8 oz of milk or yogurt or 1 oz of cheese, 1 Banana, 1 serving of green Leafy vegetable has about 300 mg of Calcium, so you may subtract that amount. Calcium citrate is the only acceptable supplement to take if you take an acid suppressing medication like Prilosec; otherwise Calcium carbonate is acceptable too (It can cause Constipation).   b - Vitamin D - 2000 IU daily      9. Please get your Living will / Advance directive completed if you do not have one already. Please make sure our office has a copy of the latest one.      10. Colonoscopy: Uptodate. Last done in Aug 2017, repeat in Aug 2027  11. Mammogram: Uptodate. Ordered for July 2025   12. PAP: S/P hysterectomy. The patient will follow up with OBGYN   13. DEXA: Done last year was normal  14: Skin Check: Please see Dermatology once a year for a Skin Check. Follows up with Dermatology for rosacea.     16. Anxiety and depression: Continue Zoloft at 150 mg.      17. Hyperlipidemia: LDL is 178. Elevated lipoprotein A. The patient will start taking Zetia 10 mg. Will recheck numbers in 3 months.     18. Recommend discontinuing vit D at this time for 3 months and then take half of the pills.         Follow up in one year for a Physical. Please call the office before your Physical to see if you need blood work completed prior to your physical.     Please call me if any questions arise from now until your next visit. I will call you after I am done seeing patients. A Doctor  is always available by phone when the office is closed. Please feel free to call for help with any problem that you feel shouldn't wait until the office re-opens.     I, Sara Ma MD, attest that this note for 5/19/2025 accurately reflects documentation that my scribe Charlene Magallon, made at my direction in my capacity as Sara Ma MD when I treated Chiquis Howard.    Scribe Attestation  By signing my name below, ICharlene, Hanselibe   attest that this documentation has been prepared under the direction and in the presence of Sara Ma MD.

## 2025-06-19 ENCOUNTER — APPOINTMENT (OUTPATIENT)
Dept: OTOLARYNGOLOGY | Facility: CLINIC | Age: 65
End: 2025-06-19
Payer: COMMERCIAL

## 2025-06-24 ENCOUNTER — APPOINTMENT (OUTPATIENT)
Dept: OTOLARYNGOLOGY | Facility: CLINIC | Age: 65
End: 2025-06-24
Payer: COMMERCIAL

## 2025-06-24 DIAGNOSIS — H61.23 BILATERAL IMPACTED CERUMEN: Primary | ICD-10-CM

## 2025-06-24 DIAGNOSIS — L29.9 EAR ITCHING: ICD-10-CM

## 2025-06-24 DIAGNOSIS — H93.8X1 SENSATION OF FULLNESS IN RIGHT EAR: ICD-10-CM

## 2025-06-24 PROCEDURE — 99213 OFFICE O/P EST LOW 20 MIN: CPT | Performed by: NURSE PRACTITIONER

## 2025-06-24 PROCEDURE — 1036F TOBACCO NON-USER: CPT | Performed by: NURSE PRACTITIONER

## 2025-06-24 PROCEDURE — 69210 REMOVE IMPACTED EAR WAX UNI: CPT | Performed by: NURSE PRACTITIONER

## 2025-06-24 NOTE — PROGRESS NOTES
Subjective   Patient ID: Chiquis Howard is a 64 y.o. female who presents for Follow-up (Ear cleaning ).  HPI  This patient presents for a follow-up visit..  Today, she denies any otalgia, otorrhea.  She does not wear hearing amplification.  She complains of right EAC fullness and itching.  Review of Systems  A comprehensive or 10 points review of the patient´s constitutional, neurological, HEENT, pulmonary, cardiovascular and genito-urinary systems showed only those mentioned in history of present illness.    Objective   Physical Exam  Constitutional: no fever, chills, weight loss or weight gain   General appearance: Appears well, well-nourished, well groomed. No acute distress.   Communication: Normal communication   Psychiatric: Oriented to person, place and time. Normal mood and affect.   Neurologic: Cranial nerves II-XII grossly intact and symmetric bilaterally.   Head and Face:   Head: Atraumatic with no masses, lesions or scarring.   Face: Normal symmetry, no paralysis, synkinesis or facial tic. No scars or deformities.     Eyes: Conjunctiva not edematous or erythematous   Ears: External inspection of ears with no deformity, scars or masses.  Bilateral canals with cerumen impactions  Neck: Normal appearing, symmetric, trachea midline.   Cardiovascular: Examination of peripheral vascular system shows no clubbing or cyanosis.   Respiratory: No respiratory distress increased work of breathing. Inspection of the chest with symmetric chest expansion and normal respiratory effort.   Skin: No rashes in the head or neck    Assessment/Plan     This patient presents for subsequent evaluation of acute acquired bilateral cerumen impaction, right aural fullness and right ear itching.      Reassurance given that otologic exam is normal after cleaning. She may follow-up as needed for cleanings.  All questions were answered to patient's satisfaction.    This note was created using speech recognition transcription software.  Despite proofreading, several typographical errors might be present that might affect the meaning of the content. Please call with any questions.  Patient ID: Chiquis Howard is a 64 y.o. female.    Ear cerumen removal    Date/Time: 6/24/2025 8:49 AM    Performed by: DANYA Arana  Authorized by: DANYA Arana    Consent:     Consent obtained:  Verbal    Consent given by:  Patient    Risks discussed:  Pain    Alternatives discussed:  No treatment  Procedure details:     Location:  L ear and R ear    Procedure type: curette      Procedure type comment:  Suction    Procedure outcomes: cerumen removed    Post-procedure details:     Inspection:  No bleeding, ear canal clear and TM intact    Hearing quality:  Improved    Procedure completion:  Tolerated well, no immediate complications  Comments:      Bilateral TMs coated with dry cerumen that was easily removed.         DANYA Arana 06/24/25 8:48 AM

## 2025-07-08 ENCOUNTER — APPOINTMENT (OUTPATIENT)
Dept: RADIOLOGY | Facility: CLINIC | Age: 65
End: 2025-07-08
Payer: COMMERCIAL

## 2025-07-19 DIAGNOSIS — E78.41 ELEVATED LIPOPROTEIN(A): ICD-10-CM

## 2025-07-20 ENCOUNTER — CLINICAL SUPPORT (OUTPATIENT)
Dept: EMERGENCY MEDICINE | Facility: HOSPITAL | Age: 65
End: 2025-07-20
Payer: COMMERCIAL

## 2025-07-20 ENCOUNTER — APPOINTMENT (OUTPATIENT)
Dept: RADIOLOGY | Facility: HOSPITAL | Age: 65
End: 2025-07-20
Payer: COMMERCIAL

## 2025-07-20 ENCOUNTER — HOSPITAL ENCOUNTER (OUTPATIENT)
Facility: HOSPITAL | Age: 65
Setting detail: OBSERVATION
Discharge: HOME | End: 2025-07-21
Attending: STUDENT IN AN ORGANIZED HEALTH CARE EDUCATION/TRAINING PROGRAM | Admitting: EMERGENCY MEDICINE
Payer: COMMERCIAL

## 2025-07-20 DIAGNOSIS — R42 DIZZINESS: Primary | ICD-10-CM

## 2025-07-20 DIAGNOSIS — I77.71 BILATERAL CAROTID ARTERY DISSECTION (MULTI): ICD-10-CM

## 2025-07-20 LAB
APTT PPP: 28 SECONDS (ref 26–36)
ATRIAL RATE: 63 BPM
CARDIAC TROPONIN I PNL SERPL HS: 5 NG/L (ref 0–34)
ERYTHROCYTE [DISTWIDTH] IN BLOOD BY AUTOMATED COUNT: 12.9 % (ref 11.5–14.5)
GLUCOSE BLD MANUAL STRIP-MCNC: 118 MG/DL (ref 74–99)
HCT VFR BLD AUTO: 39.2 % (ref 36–46)
HGB BLD-MCNC: 13.5 G/DL (ref 12–16)
INR PPP: 1 (ref 0.9–1.1)
MCH RBC QN AUTO: 30.1 PG (ref 26–34)
MCHC RBC AUTO-ENTMCNC: 34.4 G/DL (ref 32–36)
MCV RBC AUTO: 87 FL (ref 80–100)
NRBC BLD-RTO: 0 /100 WBCS (ref 0–0)
P AXIS: 79 DEGREES
P OFFSET: 178 MS
P ONSET: 129 MS
PLATELET # BLD AUTO: 171 X10*3/UL (ref 150–450)
PR INTERVAL: 194 MS
PROTHROMBIN TIME: 11.2 SECONDS (ref 9.8–12.4)
Q ONSET: 226 MS
QRS COUNT: 10 BEATS
QRS DURATION: 68 MS
QT INTERVAL: 406 MS
QTC CALCULATION(BAZETT): 415 MS
QTC FREDERICIA: 412 MS
R AXIS: 83 DEGREES
RBC # BLD AUTO: 4.49 X10*6/UL (ref 4–5.2)
T AXIS: 84 DEGREES
T OFFSET: 429 MS
VENTRICULAR RATE: 63 BPM
WBC # BLD AUTO: 5.8 X10*3/UL (ref 4.4–11.3)

## 2025-07-20 PROCEDURE — 70450 CT HEAD/BRAIN W/O DYE: CPT | Performed by: RADIOLOGY

## 2025-07-20 PROCEDURE — 85610 PROTHROMBIN TIME: CPT

## 2025-07-20 PROCEDURE — 2500000004 HC RX 250 GENERAL PHARMACY W/ HCPCS (ALT 636 FOR OP/ED): Mod: TB

## 2025-07-20 PROCEDURE — 80061 LIPID PANEL: CPT | Performed by: NURSE PRACTITIONER

## 2025-07-20 PROCEDURE — 82947 ASSAY GLUCOSE BLOOD QUANT: CPT

## 2025-07-20 PROCEDURE — 36415 COLL VENOUS BLD VENIPUNCTURE: CPT

## 2025-07-20 PROCEDURE — 93005 ELECTROCARDIOGRAM TRACING: CPT

## 2025-07-20 PROCEDURE — 99285 EMERGENCY DEPT VISIT HI MDM: CPT | Mod: 25 | Performed by: STUDENT IN AN ORGANIZED HEALTH CARE EDUCATION/TRAINING PROGRAM

## 2025-07-20 PROCEDURE — 70450 CT HEAD/BRAIN W/O DYE: CPT

## 2025-07-20 PROCEDURE — 96375 TX/PRO/DX INJ NEW DRUG ADDON: CPT | Mod: 59

## 2025-07-20 PROCEDURE — 80053 COMPREHEN METABOLIC PANEL: CPT

## 2025-07-20 PROCEDURE — 83880 ASSAY OF NATRIURETIC PEPTIDE: CPT | Performed by: NURSE PRACTITIONER

## 2025-07-20 PROCEDURE — 83036 HEMOGLOBIN GLYCOSYLATED A1C: CPT | Performed by: NURSE PRACTITIONER

## 2025-07-20 PROCEDURE — 85027 COMPLETE CBC AUTOMATED: CPT

## 2025-07-20 PROCEDURE — 84484 ASSAY OF TROPONIN QUANT: CPT

## 2025-07-20 PROCEDURE — 86901 BLOOD TYPING SEROLOGIC RH(D): CPT

## 2025-07-20 RX ORDER — DIPHENHYDRAMINE HYDROCHLORIDE 50 MG/ML
50 INJECTION, SOLUTION INTRAMUSCULAR; INTRAVENOUS ONCE
Status: COMPLETED | OUTPATIENT
Start: 2025-07-21 | End: 2025-07-21

## 2025-07-20 RX ADMIN — METHYLPREDNISOLONE SODIUM SUCCINATE 40 MG: 125 INJECTION, POWDER, FOR SOLUTION INTRAMUSCULAR; INTRAVENOUS at 23:12

## 2025-07-21 ENCOUNTER — APPOINTMENT (OUTPATIENT)
Dept: RADIOLOGY | Facility: HOSPITAL | Age: 65
End: 2025-07-21
Payer: COMMERCIAL

## 2025-07-21 ENCOUNTER — APPOINTMENT (OUTPATIENT)
Dept: CARDIOLOGY | Facility: HOSPITAL | Age: 65
End: 2025-07-21
Payer: COMMERCIAL

## 2025-07-21 VITALS
DIASTOLIC BLOOD PRESSURE: 67 MMHG | HEART RATE: 71 BPM | HEIGHT: 66 IN | BODY MASS INDEX: 22.82 KG/M2 | SYSTOLIC BLOOD PRESSURE: 119 MMHG | WEIGHT: 142 LBS | TEMPERATURE: 97.5 F | RESPIRATION RATE: 15 BRPM | OXYGEN SATURATION: 99 %

## 2025-07-21 PROBLEM — I77.71 BILATERAL CAROTID ARTERY DISSECTION (MULTI): Status: ACTIVE | Noted: 2025-07-21

## 2025-07-21 LAB
ABO GROUP (TYPE) IN BLOOD: NORMAL
ALBUMIN SERPL BCP-MCNC: 4.1 G/DL (ref 3.4–5)
ALP SERPL-CCNC: 49 U/L (ref 33–136)
ALT SERPL W P-5'-P-CCNC: 13 U/L (ref 7–45)
ANION GAP SERPL CALC-SCNC: 11 MMOL/L (ref 10–20)
ANTIBODY SCREEN: NORMAL
AORTIC VALVE PEAK VELOCITY: 1.63 M/S
APPEARANCE UR: CLEAR
AST SERPL W P-5'-P-CCNC: 18 U/L (ref 9–39)
AV PEAK GRADIENT: 11 MMHG
AVA (PEAK VEL): 3.19 CM2
BILIRUB SERPL-MCNC: 0.3 MG/DL (ref 0–1.2)
BILIRUB UR STRIP.AUTO-MCNC: NEGATIVE MG/DL
BNP SERPL-MCNC: 12 PG/ML (ref 0–99)
BUN SERPL-MCNC: 15 MG/DL (ref 6–23)
CALCIUM SERPL-MCNC: 9.4 MG/DL (ref 8.6–10.6)
CHLORIDE SERPL-SCNC: 106 MMOL/L (ref 98–107)
CHOLEST SERPL-MCNC: 214 MG/DL (ref 0–199)
CHOLESTEROL/HDL RATIO: 3.9
CO2 SERPL-SCNC: 27 MMOL/L (ref 21–32)
COLOR UR: ABNORMAL
CREAT SERPL-MCNC: 0.53 MG/DL (ref 0.5–1.05)
EGFRCR SERPLBLD CKD-EPI 2021: >90 ML/MIN/1.73M*2
EJECTION FRACTION APICAL 4 CHAMBER: 71.2
EJECTION FRACTION: 73 %
EST. AVERAGE GLUCOSE BLD GHB EST-MCNC: 100 MG/DL
GLUCOSE SERPL-MCNC: 110 MG/DL (ref 74–99)
GLUCOSE UR STRIP.AUTO-MCNC: NORMAL MG/DL
HBA1C MFR BLD: 5.1 % (ref ?–5.7)
HDLC SERPL-MCNC: 55.4 MG/DL
HOLD SPECIMEN: NORMAL
KETONES UR STRIP.AUTO-MCNC: NEGATIVE MG/DL
LDLC SERPL CALC-MCNC: 122 MG/DL
LEFT ATRIUM VOLUME AREA LENGTH INDEX BSA: 18 ML/M2
LEFT VENTRICLE INTERNAL DIMENSION DIASTOLE: 4.3 CM (ref 3.5–6)
LEFT VENTRICULAR OUTFLOW TRACT DIAMETER: 2.2 CM
LEUKOCYTE ESTERASE UR QL STRIP.AUTO: NEGATIVE
MITRAL VALVE E/A RATIO: 1.09
MUCOUS THREADS #/AREA URNS AUTO: NORMAL /LPF
NITRITE UR QL STRIP.AUTO: NEGATIVE
NON HDL CHOLESTEROL: 159 MG/DL (ref 0–149)
PH UR STRIP.AUTO: 6.5 [PH]
POTASSIUM SERPL-SCNC: 3.6 MMOL/L (ref 3.5–5.3)
PROT SERPL-MCNC: 6.5 G/DL (ref 6.4–8.2)
PROT UR STRIP.AUTO-MCNC: ABNORMAL MG/DL
RBC # UR STRIP.AUTO: NEGATIVE MG/DL
RBC #/AREA URNS AUTO: NORMAL /HPF
RH FACTOR (ANTIGEN D): NORMAL
RIGHT VENTRICLE FREE WALL PEAK S': 17.9 CM/S
SODIUM SERPL-SCNC: 140 MMOL/L (ref 136–145)
SP GR UR STRIP.AUTO: >1.05
TRICUSPID ANNULAR PLANE SYSTOLIC EXCURSION: 2.5 CM
TRIGL SERPL-MCNC: 184 MG/DL (ref 0–149)
UROBILINOGEN UR STRIP.AUTO-MCNC: NORMAL MG/DL
VLDL: 37 MG/DL (ref 0–40)
WBC #/AREA URNS AUTO: NORMAL /HPF

## 2025-07-21 PROCEDURE — G0378 HOSPITAL OBSERVATION PER HR: HCPCS

## 2025-07-21 PROCEDURE — 70540 MRI ORBIT/FACE/NECK W/O DYE: CPT

## 2025-07-21 PROCEDURE — 70498 CT ANGIOGRAPHY NECK: CPT | Performed by: RADIOLOGY

## 2025-07-21 PROCEDURE — 2500000004 HC RX 250 GENERAL PHARMACY W/ HCPCS (ALT 636 FOR OP/ED)

## 2025-07-21 PROCEDURE — 70496 CT ANGIOGRAPHY HEAD: CPT | Performed by: RADIOLOGY

## 2025-07-21 PROCEDURE — 70551 MRI BRAIN STEM W/O DYE: CPT

## 2025-07-21 PROCEDURE — 81003 URINALYSIS AUTO W/O SCOPE: CPT

## 2025-07-21 PROCEDURE — 96376 TX/PRO/DX INJ SAME DRUG ADON: CPT | Mod: 59

## 2025-07-21 PROCEDURE — 2550000001 HC RX 255 CONTRASTS: Performed by: STUDENT IN AN ORGANIZED HEALTH CARE EDUCATION/TRAINING PROGRAM

## 2025-07-21 PROCEDURE — 70498 CT ANGIOGRAPHY NECK: CPT

## 2025-07-21 PROCEDURE — 96374 THER/PROPH/DIAG INJ IV PUSH: CPT | Mod: 59

## 2025-07-21 PROCEDURE — 99223 1ST HOSP IP/OBS HIGH 75: CPT

## 2025-07-21 PROCEDURE — 2500000001 HC RX 250 WO HCPCS SELF ADMINISTERED DRUGS (ALT 637 FOR MEDICARE OP): Performed by: NURSE PRACTITIONER

## 2025-07-21 PROCEDURE — 93306 TTE W/DOPPLER COMPLETE: CPT

## 2025-07-21 PROCEDURE — 93306 TTE W/DOPPLER COMPLETE: CPT | Performed by: INTERNAL MEDICINE

## 2025-07-21 PROCEDURE — 2500000001 HC RX 250 WO HCPCS SELF ADMINISTERED DRUGS (ALT 637 FOR MEDICARE OP)

## 2025-07-21 PROCEDURE — 70551 MRI BRAIN STEM W/O DYE: CPT | Performed by: RADIOLOGY

## 2025-07-21 PROCEDURE — 70540 MRI ORBIT/FACE/NECK W/O DYE: CPT | Performed by: RADIOLOGY

## 2025-07-21 RX ORDER — NAPROXEN SODIUM 220 MG/1
81 TABLET, FILM COATED ORAL DAILY
Qty: 60 TABLET | Refills: 0 | Status: SHIPPED | OUTPATIENT
Start: 2025-07-21 | End: 2026-07-21

## 2025-07-21 RX ORDER — AZELASTINE 1 MG/ML
1 SPRAY, METERED NASAL 2 TIMES DAILY
COMMUNITY

## 2025-07-21 RX ORDER — SERTRALINE HYDROCHLORIDE 50 MG/1
100 TABLET, FILM COATED ORAL DAILY
Status: DISCONTINUED | OUTPATIENT
Start: 2025-07-22 | End: 2025-07-21 | Stop reason: HOSPADM

## 2025-07-21 RX ORDER — FLUTICASONE PROPIONATE 50 MCG
1 SPRAY, SUSPENSION (ML) NASAL 2 TIMES DAILY
Status: DISCONTINUED | OUTPATIENT
Start: 2025-07-21 | End: 2025-07-21 | Stop reason: HOSPADM

## 2025-07-21 RX ORDER — CLOPIDOGREL BISULFATE 75 MG/1
75 TABLET ORAL DAILY
Status: DISCONTINUED | OUTPATIENT
Start: 2025-07-21 | End: 2025-07-21 | Stop reason: HOSPADM

## 2025-07-21 RX ORDER — ACETAMINOPHEN 325 MG/1
650 TABLET ORAL EVERY 4 HOURS PRN
Status: DISCONTINUED | OUTPATIENT
Start: 2025-07-21 | End: 2025-07-21 | Stop reason: HOSPADM

## 2025-07-21 RX ORDER — AZELASTINE 1 MG/ML
1 SPRAY, METERED NASAL 2 TIMES DAILY
Status: DISCONTINUED | OUTPATIENT
Start: 2025-07-21 | End: 2025-07-21 | Stop reason: HOSPADM

## 2025-07-21 RX ORDER — NAPROXEN SODIUM 220 MG/1
81 TABLET, FILM COATED ORAL DAILY
Status: DISCONTINUED | OUTPATIENT
Start: 2025-07-21 | End: 2025-07-21 | Stop reason: HOSPADM

## 2025-07-21 RX ORDER — CLOPIDOGREL BISULFATE 75 MG/1
75 TABLET ORAL DAILY
Qty: 60 TABLET | Refills: 0 | Status: SHIPPED | OUTPATIENT
Start: 2025-07-21 | End: 2025-09-19

## 2025-07-21 RX ORDER — ACETAMINOPHEN 325 MG/1
975 TABLET ORAL ONCE
Status: COMPLETED | OUTPATIENT
Start: 2025-07-21 | End: 2025-07-21

## 2025-07-21 RX ADMIN — IOHEXOL 75 ML: 350 INJECTION, SOLUTION INTRAVENOUS at 05:29

## 2025-07-21 RX ADMIN — METHYLPREDNISOLONE SODIUM SUCCINATE 40 MG: 125 INJECTION, POWDER, FOR SOLUTION INTRAMUSCULAR; INTRAVENOUS at 04:12

## 2025-07-21 RX ADMIN — ACETAMINOPHEN 975 MG: 325 TABLET ORAL at 11:15

## 2025-07-21 RX ADMIN — ASPIRIN 81 MG: 81 TABLET, CHEWABLE ORAL at 17:36

## 2025-07-21 RX ADMIN — DIPHENHYDRAMINE HYDROCHLORIDE 50 MG: 50 INJECTION INTRAMUSCULAR; INTRAVENOUS at 04:12

## 2025-07-21 RX ADMIN — CLOPIDOGREL BISULFATE 75 MG: 75 TABLET, FILM COATED ORAL at 17:36

## 2025-07-21 ASSESSMENT — PAIN - FUNCTIONAL ASSESSMENT: PAIN_FUNCTIONAL_ASSESSMENT: 0-10

## 2025-07-21 NOTE — SIGNIFICANT EVENT
Neurology Updates    See consult note for details    - MRI neck fat sat to evaluate for carotid dissections and MRI brain stroke protocol  - TTE with bubble study  - Start DAPT (ASA 81mg and Plavix 75mg) daily, plan to continue until outpatient follow up  - Will need repeat imaging in 3 months  - Further recs pending imaging    Janine Delgadillo MD   PGY-4 Neurology  Stroke [phone]      UPDATE:  MRI brain with no infarcts  MR fat sat with bl carotid dissections and c/f fibromuscular dysplasia    - Ok to discharge from stroke perspective  - Please discharge with DAPT to be taken at least until outpatient follow up  - Patient will need follow up with vascular neurology    Discussed case with Dr Ortega, stroke attending, who agrees with plan    Janine Delgadillo MD   PGY-4 Neurology  Stroke [phone]

## 2025-07-21 NOTE — H&P
History and Physical  [facility] CLINICAL DECISION  Patient: Chiquis Howard  MRN: [id number]  : 1960  Date of Evaluation: 2025  ED Provider: DANYA Mooney      Limitations to history: none  Independent Historian: patient  External Records Reviewed: outpatient records, inpatient notes, ed records      Patient History:  Chiquis Howard is a 64 y.o. 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  Patient states that yesterday evening she developed sudden onset left posterior neck pain.  Patient states that she additionally developed a frontal headache in which she currently rated a 10 out of 10.  Patient was having dysarthria/expressive aphasia.  Patient reports symptoms lasted less than 15 minutes.  She reports that her neighbor drove her to the emergency room yesterday evening.  Denies any weakness, numbness or tingling.  CT head obtained in the emergency room showed no acute intercranial process.  CTA head and neck showed bilateral dissections of the mid cervical internal carotid arteries with associated ectasia and beading c/w possible fibromuscular dysplasia. Patient was evaluated by the Neurology team. Please see consult note for complete details. Patient was sent to the CDU for further TIA work up.    PMH: right optic nerve meningioma, MDD  PSH: Ovarian cyst removal, hysterectomy  Allergies: IV dye  Social HX: Denies smoking, alcohol or drug use.  Family HX: No family history pertinent to current presenting problem  Medications: Reviewed per EMR    The acute evaluation included:  Orders Placed This Encounter   Procedures    CT head wo IV contrast    CT angio head and neck w and wo IV contrast    MR brain wo IV contrast    MR neck soft tissue only wo IV contrast    CBC    Comprehensive metabolic panel    Troponin I, High Sensitivity    Urinalysis with Reflex Culture and  Microscopic    Coagulation Screen    Type and Screen    Urinalysis with Reflex Culture and Microscopic    Extra Urine Gray Tube    Lipid Panel    Hemoglobin A1c    B-Type Natriuretic Peptide    Adult diet Regular    Vital Signs    Notify provider    Neuro checks    Nursing stroke/TIA swallow screen    Provide patient specific Stroke education    Cardiac Monitoring Lead and Site Care    Activity (specify) No Restrictions    Inpatient consult to neurology    POCT GLUCOSE    ECG 12 lead    ECG 12 lead    Electrocardiogram, 12-lead    Transthoracic Echo Complete    Initiate Observation Send to CDU       I reviewed the below labs and imaging as ordered by the ED provider:  CT angio head and neck w and wo IV contrast   Final Result   CTA neck:   1. The mid cervical segment of the left internal carotid artery has   localized ectasia and focal dissection consistent with fibromuscular   dysplasia. The carotid bulb and more proximal cervical segment also   have low-density non stenotic plaque versus localized beading as well.   2. The mid cervical and distal cervical segments of the right   internal carotid artery have beading and localized dissection   consistent with fibromuscular dysplasia.   3. The proximal left vertebral artery is partially obscured by   artifact from adjacent contrast in the venous systems; otherwise the   vertebral arteries are normal.             CTA head:   No evidence for significant stenosis or large branch vessel cutoffs   of the intracranial vessels.        I personally reviewed the images/study and I agree with the findings   as stated by Reynold Hoyos MD (PGY-3). This study was interpreted at   [facility], [place], [place].        MACRO:   Dr. Reynold Hoyos discussed the significance and urgency of this   critical finding by EPIC secure chat with Dr. NICCI MARTELL on   7/21/2025 at 6:26 am.  (**-RCF-**) Findings:  See findings.             Signed by: Anderson  Burfara 7/21/2025 7:37 AM   Dictation workstation:   [id number]      CT head wo IV contrast   Final Result   No acute intracranial abnormality.             MACRO:   None        Signed by: Josie Anton 7/20/2025 11:18 PM   Dictation workstation:   [id number]      Transthoracic Echo Complete    (Results Pending)   MR brain wo IV contrast    (Results Pending)   MR neck soft tissue only wo IV contrast    (Results Pending)       Labs Reviewed   COMPREHENSIVE METABOLIC PANEL - Abnormal       Result Value    Glucose 110 (*)     Sodium 140      Potassium 3.6      Chloride 106      Bicarbonate 27      Anion Gap 11      Urea Nitrogen 15      Creatinine 0.53      eGFR >90      Calcium 9.4      Albumin 4.1      Alkaline Phosphatase 49      Total Protein 6.5      AST 18      Bilirubin, Total 0.3      ALT 13     URINALYSIS WITH REFLEX CULTURE AND MICROSCOPIC - Abnormal    Color, Urine Light-Yellow      Appearance, Urine Clear      Specific Gravity, Urine >1.050 (*)     pH, Urine 6.5      Protein, Urine 10 (TRACE)      Glucose, Urine Normal      Blood, Urine NEGATIVE      Ketones, Urine NEGATIVE      Bilirubin, Urine NEGATIVE      Urobilinogen, Urine Normal      Nitrite, Urine NEGATIVE      Leukocyte Esterase, Urine NEGATIVE     LIPID PANEL - Abnormal    Cholesterol 214 (*)     HDL-Cholesterol 55.4      Cholesterol/HDL Ratio 3.9      LDL Calculated 122 (*)     VLDL 37      Triglycerides 184 (*)     Non HDL Cholesterol 159 (*)    POCT GLUCOSE - Abnormal    POCT Glucose 118 (*)    CBC - Normal    WBC 5.8      nRBC 0.0      RBC 4.49      Hemoglobin 13.5      Hematocrit 39.2      MCV 87      MCH 30.1      MCHC 34.4      RDW 12.9      Platelets 171     TROPONIN I, HIGH SENSITIVITY - Normal    Troponin I, High Sensitivity (CMC) 5      Narrative:     Less than 99th percentile of normal range cutoff-  Female and children under 18 years old <35 ng/L; Male <54 ng/L: Negative  Repeat testing should be performed if clinically indicated.      Female and children under 18 years old  ng/L; Male  ng/L:  Consistent with possible cardiac damage and possible increased clinical   risk. Serial measurements may help to assess extent of myocardial damage.     >120 ng/L: Consistent with cardiac damage, increased clinical risk and  myocardial infarction. Serial measurements may help assess extent of   myocardial damage.      NOTE: Children less than 1 year old may have higher baseline troponin   levels and results should be interpreted in conjunction with the overall   clinical context.    NOTE: Troponin I testing is performed using a different   testing methodology at [facility] than at [facility]. Direct result comparisons should only   be made within the same method.     COAGULATION SCREEN - Normal    Protime 11.2      INR 1.0      aPTT 28      Narrative:     The APTT is no longer used for monitoring Unfractionated Heparin Therapy. For monitoring Heparin Therapy, use the Heparin Assay.   URINALYSIS WITH REFLEX CULTURE AND MICROSCOPIC    Narrative:     The following orders were created for panel order Urinalysis with Reflex Culture and Microscopic.  Procedure                               Abnormality         Status                     ---------                               -----------         ------                     Urinalysis with Reflex C...[[id number]]  Abnormal            Final result               Extra Urine Gray Tube[[id number]]                            Final result                 Please view results for these tests on the individual orders.   TYPE AND SCREEN    ABO TYPE O      Rh TYPE POS      ANTIBODY SCREEN NEG     EXTRA URINE GRAY TUBE    Extra Tube       HEMOGLOBIN A1C    Hemoglobin A1C 5.1      Estimated Average Glucose 100      Narrative:     Diagnosis of Diabetes-Adults  Non-Diabetic: < or = 5.6%  Increased risk for developing diabetes: 5.7-6.4%  Diagnostic of diabetes: > or = 6.5%       B-TYPE  "NATRIURETIC PEPTIDE   POCT GLUCOSE METER   POCT GLUCOSE METER   URINALYSIS MICROSCOPIC WITH REFLEX CULTURE    WBC, Urine 1-5      RBC, Urine NONE      Mucus, Urine FEW             After discussion with the ED provider, a decision was made to admit the patient to the Clinical Decision Unit.    Upon admission to the Clinical Decision Unit, the patient's vital signs were: Temperature 36.4, /78, HR 65, RR 18 and pulse ox 94%    Past History   Medical History[1]  Surgical History[2]  Social History[3]      Medications/Allergies     Previous Medications    ACETAMINOPHEN (TYLENOL) 325 MG TABLET    Take 2 tablets (650 mg) by mouth every 4 hours if needed for fever (temp greater than 38.0 C) or moderate pain (4 - 6) (greater than or equal to 38 C).    AZELASTINE (ASTELIN) 137 MCG (0.1 %) NASAL SPRAY    Administer 1 spray into each nostril 2 times a day. Use in each nostril as directed    CHOLECALCIFEROL (VITAMIN D-3) 25 MCG (1000 UT) CAPSULE    Take 1 tablet (50,000 Units) by mouth 1 (one) time per week.    EZETIMIBE (ZETIA) 10 MG TABLET    Take 1 tablet (10 mg) by mouth once daily.    FLUTICASONE (FLONASE) 50 MCG/ACTUATION NASAL SPRAY    Administer 1 spray into each nostril 2 times a day. Shake gently. Before first use, prime pump. After use, clean tip and replace cap.    SERTRALINE (ZOLOFT) 100 MG TABLET    Take 1 tablet (100 mg) by mouth once daily.    TIRZEPATIDE, WEIGHT LOSS, 2.5 MG/0.5 ML SOLUTION    Inject 2.5 mg under the skin every 7 days.     Allergies[4]      Review of Systems  All systems reviewed and otherwise negative, except as stated above in HPI.      Physical Exam     Visit Vitals  /78   Pulse 65   Temp 36.4 °C (97.5 °F)   Resp 18   Ht 1.676 m (5' 6\")   Wt 64.4 kg (142 lb)   SpO2 94%   BMI 22.92 kg/m²   OB Status Postmenopausal   Smoking Status Never   BSA 1.73 m²         Physical Exam:    Appearance: Alert, oriented , cooperative,  in no acute distress. Well nourished & well hydrated.    Skin: " Intact,  dry skin, no lesions, rash, petechiae or purpura.     Eyes: PERRLA, EOMs intact.    ENT: Hearing grossly intact. External auditory canals patent, tympanic membranes intact with visible landmarks. Nares patent, mucus membranes moist. Dentition without lesions. Pharynx clear, uvula midline.     Neck: Supple, without meningismus.     Pulmonary: Clear bilaterally with good chest wall excursion. No rales, rhonchi or wheezing. No accessory muscle use or stridor.    Cardiac: Normal S1, S2 without murmur, rub, gallop or extrasystole. No JVD, Carotids without bruits.    Abdomen: Soft, nontender, active bowel sounds.  No palpable organomegaly.  No rebound or guarding.  No CVA tenderness.    Musculoskeletal: Full range of motion. no pain, edema, or deformity. Pulses full and equal. No cyanosis, clubbing, or edema.    Neurological:  Cranial nerves II through XII are grossly intact, finger-nose touch is normal, normal sensation, no weakness, no focal findings identified.    Psychiatric: Appropriate mood and affect.       Consultants  1) Neurology      Impression and Plan  In summary, Chiquis Howard is admitted to the [facility] Clinical Decision Unit for carotid dissection/TIA work up. Dr. Rendon is the CDU admission attending.    This patient has been risk-stratified based on available history, physical exam, and related study findings. Admission to the observation status for further diagnosis/treatment/monitoring of carotid dissection is warranted clinically. This extended period of observation is specifically required to determine the need for hospitalization.     The goals of this admission based on the patient’s clinical problem list are:  1) Symptomatic improvement  2) Stable vital signs    Bilateral carotid dissection:  - Echo with bubble study  - MRI brain, neck  - Telemetry  - Swallow evaluation  -Neuro checks every 4 hours    When met, appropriate disposition will be arranged.      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    Meningioma of optic nerve sheath    Contusion of left hand, initial encounter 10/04/2017    Contusion of left hand including fingers, initial encounter    Encounter for general adult medical examination without abnormal findings 02/16/2017    Annual physical exam    Impacted cerumen, right ear 10/29/2021    Impacted cerumen of right ear    Other conditions influencing health status     Recent Weight Gain Involuntary    Other specified disorders of right ear 10/29/2021    Fullness in ear, right    Pain in right hip 08/26/2021    Right hip pain    Pain in right hip 01/09/2015    Hip pain, bilateral    Personal history of neoplasm of uncertain behavior 01/27/2014    History of neoplasm of bladder    Personal history of other diseases of the respiratory system 03/03/2015    History of acute sinusitis    Personal history of other infectious and parasitic diseases 11/24/2015    History of viral exanthem    Personal history of other specified conditions 08/25/2017    History of abdominal pain    Personal history of other specified conditions 01/27/2014    History of gross hematuria    Personal history of other specified conditions 08/14/2017    History of nausea and vomiting    Unspecified injury of left wrist, hand and finger(s), initial encounter 10/04/2017    Injury of left hand, initial encounter   [2]   Past Surgical History:  Procedure Laterality Date    LAPAROSCOPY DIAGNOSTIC / BIOPSY / ASPIRATION / LYSIS  04/07/2015    Exploratory Laparoscopy    OTHER SURGICAL HISTORY  04/07/2015    Vaginal Hysterectomy With Colpectomy    TONSILLECTOMY  04/07/2015    Tonsillectomy With Adenoidectomy   [3]   Social History  Socioeconomic History    Marital status:     Number of children: 3   Tobacco Use    Smoking status: Never     Passive  exposure: Never    Smokeless tobacco: Never   Substance and Sexual Activity    Alcohol use: Yes     Comment: occasionally    Drug use: Never    Sexual activity: Not Currently     Partners: Male     Birth control/protection: Post-menopausal     Social Drivers of Health     Financial Resource Strain: Low Risk  (1/17/2025)    Overall Financial Resource Strain (CARDIA)     Difficulty of Paying Living Expenses: Not hard at all   Food Insecurity: No Food Insecurity (1/17/2025)    Hunger Vital Sign     Worried About Running Out of Food in the Last Year: Never true     Ran Out of Food in the Last Year: Never true   Transportation Needs: No Transportation Needs (1/17/2025)    PRAPARE - Transportation     Lack of Transportation (Medical): No     Lack of Transportation (Non-Medical): No   Intimate Partner Violence: Not At Risk (1/17/2025)    Humiliation, Afraid, Rape, and Kick questionnaire     Fear of Current or Ex-Partner: No     Emotionally Abused: No     Physically Abused: No     Sexually Abused: No   Housing Stability: Low Risk  (1/17/2025)    Housing Stability Vital Sign     Unable to Pay for Housing in the Last Year: No     Number of Times Moved in the Last Year: 0     Homeless in the Last Year: No   [4]   Allergies  Allergen Reactions    Iodinated Contrast Media Hives and Shortness of breath     1998, pt states breathing was affected and hives   CT contrast in 1998.    Statins-Hmg-Coa Reductase Inhibitors Diarrhea

## 2025-07-21 NOTE — CONSULTS
"Inpatient consult to Neurology  Consult performed by: Ruby Campbell MD  Consult ordered by: Ruth West MD      History Of Present Illness  Chiquis Howard is a 64 y.o. 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 Given sudden onset severe pain, the patient called her neighbor Ruby who noted dysarthria and word finding difficulty. The neighbor drove patient to the ED, during which time her pain and dysarthria/aphasia improved. Patient states the episode lasted < 15 minutes total. She endorses having difficulty finding words and getting her words out. She denies any associated numbness/paresthesias or weakness. She denies any difficulty walking or getting into her neighbor's car.    CTH obtained in the ED showed no acute intracranial process. CTA H&N read with bilateral dissections of the mid-cervical internal carotid arteries with associated ectasia and beading c/w possible fibromuscular dysplasia.     She states over the last several weeks she has had episodes of a \"crackling, rice krispies\" sound in her left ear with associated dizziness/vertigo. She states these episodes have no clear trigger and last up to 10 seconds at a time. She states these episodes have been increasing in frequency and have no clear trigger. She denies any associated diplopia. She denies any recent massages, chiropractic manipulation, neck injuries, or trauma/car accidents. She does do a \"light\" yoga, but denies any excessive extension of the neck. She denies any history of difficult to control hypertension, kidney disease, or hypermobility.    She states she had similar crackling sounds 1 year ago and was evaluated by ENT 2/2024, at which time she was diagnosed with eustachian tube dysfunction and treated with flonase/azelastine. She was also found to have left " "sensorineural hearing loss. MRI IAC w/wo was ordered which showed stable extra-axial dural-based lesion along right planum sphenoidale c/w meningioma, no abnormality of bilateral internal auditory canals.     Social history:  - she recently retired 1 year ago, previously worked in investment management  - denies history of tobacco or illicit drug use  - endorses social alcohol use, typically 1 drink or less per week    Last known well: 7/20 2030  Had stroke symptoms resolved at time of presentation: Yes  Past Medical History  Medical History[1]  Surgical History  Surgical History[2]  Social History  Social History[3]  Allergies  Iodinated contrast media and Statins-hmg-coa reductase inhibitors  Home Medications  Prescriptions Prior to Admission[4]    Physical Exam  Last Recorded Vitals  Blood pressure 119/69, pulse 66, temperature 36.4 °C (97.5 °F), resp. rate 16, height 1.676 m (5' 6\"), weight 64.4 kg (142 lb), SpO2 (!) 92%.    General Appearance:  No distress, alert, interactive and cooperative.     Cardiovascular: Regular, rate and rhythm, no murmurs. No carotid bruits appreciated.     Mental State: Orientation was normal to time, place and person. Recent and remote memory was intact.  Attention span and concentration were normal.     Cranial Nerves:   CN: Visual fields full to finger count. She endorses central vision loss in right eye 2/2 meningioma.  CN 3, 4, 6: Pupils round, 4 mm in diameter, equally reactive to light. Lids symmetric; no ptosis. EOMs normal alignment, full range with normal pursuit. No nystagmus.  CN 5: Facial sensation intact bilaterally.   CN 7: Normal and symmetric facial strength. Nasolabial folds symmetric.   CN 8: Hearing intact to voice  CN 9: Palate elevates symmetrically.   CN 11: Normal strength of shoulder shrug and neck turning.   CN 12: Tongue midline, with normal bulk and strength; no fasciculations.     Motor: Muscle bulk and tone were normal in both upper and lower " extremities. No fasciculations, tremor or other abnormal movements were present.     Strength    R L  Shoulder abduction 5 5  Elbow extension 5 5  Elbow flexion  5 5     5 5    Hip flexion       5 5  DorsiFlex      5       5  PlantarFlex           5        5    Reflexes: Right/ Left:  Biceps 2/2, brachioradialis 2/2, triceps 2/2, patellar 2/2, ankle 2/2. No clonus. Toes downgoing to plantar stimulation bilaterally.    Sensory: In both upper and lower extremities, sensation was intact to light touch    Coordination: In both upper extremities, finger-nose-finger was intact without dysmetria or overshoot.       Gait: Station was stable with a normal base. Gait was stable with a normal arm swing and speed. No ataxia, shuffling, steppage or waddling was present. No circumduction was present.     NIHSS  Level of consciousness: 0 = Alert  LOC Question: 0 = Both correct  LOC Commands: 0 = Obeys both  Best Gaze: 0 = Normal  Visual Field: 0 = No visual loss  Facial Paresis: 0 = Normal  LUE: 0 = No drift; 10 sec  RUE: 0 = No drift; 10 sec  LLE: 0 = No drift; 5 sec  RLE: 0 = No drift; 5 sec  Dysarthria: 0 = Normal  Best Language: 0 = No aphasia  Limb Ataxia: 0 = Absent  Sensory: 0 = Absent  Neglect: 0 = None    NIHSS Score: 0      I have personally reviewed the following imaging results:   Imaging  CT angio head and neck w and wo IV contrast  Result Date: 7/21/2025  CTA neck: 1. The mid cervical segment of the left internal carotid artery has localized ectasia and focal dissection consistent with fibromuscular dysplasia. The carotid bulb and more proximal cervical segment also have low-density non stenotic plaque versus localized beading as well. 2. The mid cervical and distal cervical segments of the right internal carotid artery have beading and localized dissection consistent with fibromuscular dysplasia. 3. The proximal left vertebral artery is partially obscured by artifact from adjacent contrast in the venous systems;  otherwise the vertebral arteries are normal.     CTA head: No evidence for significant stenosis or large branch vessel cutoffs of the intracranial vessels.   I personally reviewed the images/study and I agree with the findings as stated by Reynold Hoyos MD (PGY-3). This study was interpreted at [facility], [place], [place].   MACRO: Dr. Reynold Hoyos discussed the significance and urgency of this critical finding by EPIC secure chat with Dr. NICCI MARTELL on 7/21/2025 at 6:26 am.  (**-RCF-**) Findings:  See findings.     Signed by: Anderson Gauthier 7/21/2025 7:37 AM Dictation workstation:   [id number]    CT head wo IV contrast  Result Date: 7/20/2025  No acute intracranial abnormality.     MACRO: None   Signed by: Josie Anton 7/20/2025 11:18 PM Dictation workstation:   [id number]      Lab Results   Component Value Date    HGBA1C 5.3 05/14/2025    LDLF 158 (H) 02/15/2023    LDLCALC 178 (H) 05/14/2025          Assessment/Plan   Assessment & Plan      Chiquis Howard is a 64 y.o. woman with history of R optic nerve meningioma s/p radiation 1998 and MDD on sertraline who presented to [facility] the evening of 7/20 with sudden onset left neck and bifrontal 10/10 headache with associated dysarthria/expressive aphasia. Symptoms started at 8:30pm and resolved by the time patient arrived in the ED (total duration < 15 minutes). CTA H&N reviewed, b/l carotid abnormalities could be c/w dissection vs web. Patient denies any preceding history of trauma or neck manipulation to explain bilateral dissections. Would get MRI fat sat to investigate, further workup pending results.    Recommendations:  - Start DAPT pending workup  - MRI brain, MRI neck fat sat  - Further work-up of underlying etiology pending MR fat sat  - Complete TIA workup: TTE w/ bubble study    Patient to be formally staffed with attending physician in the AM.       Ruby Campbell MD  Neurology, PGY-2    SENIOR RESIDENT ADDENDUM  I  have reviewed and edited the information above and I agree with the assessment and plan as outlined by the helene resident. Edits made directly into the note as necessary. In summary, pt is a 63 y/o F who presents with a recent episode of transient neck pain, HA, dysarthria, and expressive aphasia. CTA H/N was read as b/l carotid dissections, upon review b/l carotid abnormalities could be c/w dissection vs web. Would get MRI fat sat to investigate, further workup pending results. Edited above to reflect post-staffing recommendations.    Elham Squires MD   PGY-4 Neurology  Stroke [id number]          [1]   Past Medical History:  Diagnosis Date    Anxiety disorder, unspecified     Anxiety    Benign neoplasm of cerebral meninges (Multi) 04/07/2015    Meningioma of optic nerve sheath    Contusion of left hand, initial encounter 10/04/2017    Contusion of left hand including fingers, initial encounter    Encounter for general adult medical examination without abnormal findings 02/16/2017    Annual physical exam    Impacted cerumen, right ear 10/29/2021    Impacted cerumen of right ear    Other conditions influencing health status     Recent Weight Gain Involuntary    Other specified disorders of right ear 10/29/2021    Fullness in ear, right    Pain in right hip 08/26/2021    Right hip pain    Pain in right hip 01/09/2015    Hip pain, bilateral    Personal history of neoplasm of uncertain behavior 01/27/2014    History of neoplasm of bladder    Personal history of other diseases of the respiratory system 03/03/2015    History of acute sinusitis    Personal history of other infectious and parasitic diseases 11/24/2015    History of viral exanthem    Personal history of other specified conditions 08/25/2017    History of abdominal pain    Personal history of other specified conditions 01/27/2014    History of gross hematuria    Personal history of other specified conditions 08/14/2017    History of nausea and vomiting     Unspecified injury of left wrist, hand and finger(s), initial encounter 10/04/2017    Injury of left hand, initial encounter   [2]   Past Surgical History:  Procedure Laterality Date    LAPAROSCOPY DIAGNOSTIC / BIOPSY / ASPIRATION / LYSIS  04/07/2015    Exploratory Laparoscopy    OTHER SURGICAL HISTORY  04/07/2015    Vaginal Hysterectomy With Colpectomy    TONSILLECTOMY  04/07/2015    Tonsillectomy With Adenoidectomy   [3]   Social History  Tobacco Use    Smoking status: Never     Passive exposure: Never    Smokeless tobacco: Never   Substance Use Topics    Alcohol use: Yes     Comment: occasionally    Drug use: Never   [4] (Not in a hospital admission)

## 2025-07-21 NOTE — DISCHARGE SUMMARY
Disposition Note  [facility] CLINICAL DECISION  Patient: Chiquis Howard  MRN: [id number]  : 1960  Date of Evaluation: 2025  ED Provider: DANYA Mooney      Limitations to history: none  Independent Historian: patient  External Records Reviewed: outside records, inpatient notes, ed records      Subjective:    Chiquis Howard is a 64 y.o. female has undergone comprehensive diagnostic evaluation and therapeutic management in accordance with the CDU guidelines for TIA work up. Based on the patient's clinical response and diagnostic information during this period of observation, it has been determined that the patient will be discharged home.  Patient presented to [facility] ED with posterior left neck pain and a headache. CT angio of the head showed bilateral carotid dissection. Patient was sent to the CDU for a MRI and Echo.    The acute evaluation included:  Orders Placed This Encounter   Procedures    CT head wo IV contrast    CT angio head and neck w and wo IV contrast    MR brain wo IV contrast    MR neck soft tissue only wo IV contrast    CBC    Comprehensive metabolic panel    Troponin I, High Sensitivity    Urinalysis with Reflex Culture and Microscopic    Coagulation Screen    Type and Screen    Urinalysis with Reflex Culture and Microscopic    Extra Urine Gray Tube    Lipid Panel    Hemoglobin A1c    B-Type Natriuretic Peptide    Referral to Neurology    Adult diet Regular    Vital Signs    Notify provider    Neuro checks    Nursing stroke/TIA swallow screen    Provide patient specific Stroke education    Cardiac Monitoring Lead and Site Care    Activity (specify) No Restrictions    Inpatient consult to neurology    POCT GLUCOSE    ECG 12 lead    ECG 12 lead    Electrocardiogram, 12-lead    Transthoracic Echo Complete    Initiate Observation Send to CDU       Results for orders placed or performed during the hospital encounter of 25   POCT GLUCOSE    Collection Time:  07/20/25  9:13 PM   Result Value Ref Range    POCT Glucose 118 (H) 74 - 99 mg/dL   ECG 12 lead    Collection Time: 07/20/25  9:15 PM   Result Value Ref Range    Ventricular Rate 63 BPM    Atrial Rate 63 BPM    MI Interval 194 ms    QRS Duration 68 ms    QT Interval 406 ms    QTC Calculation(Bazett) 415 ms    P Axis 79 degrees    R Axis 83 degrees    T Axis 84 degrees    QRS Count 10 beats    Q Onset 226 ms    P Onset 129 ms    P Offset 178 ms    T Offset 429 ms    QTC Fredericia 412 ms   CBC    Collection Time: 07/20/25 10:58 PM   Result Value Ref Range    WBC 5.8 4.4 - 11.3 x10*3/uL    nRBC 0.0 0.0 - 0.0 /100 WBCs    RBC 4.49 4.00 - 5.20 x10*6/uL    Hemoglobin 13.5 12.0 - 16.0 g/dL    Hematocrit 39.2 36.0 - 46.0 %    MCV 87 80 - 100 fL    MCH 30.1 26.0 - 34.0 pg    MCHC 34.4 32.0 - 36.0 g/dL    RDW 12.9 11.5 - 14.5 %    Platelets 171 150 - 450 x10*3/uL   Comprehensive metabolic panel    Collection Time: 07/20/25 10:58 PM   Result Value Ref Range    Glucose 110 (H) 74 - 99 mg/dL    Sodium 140 136 - 145 mmol/L    Potassium 3.6 3.5 - 5.3 mmol/L    Chloride 106 98 - 107 mmol/L    Bicarbonate 27 21 - 32 mmol/L    Anion Gap 11 10 - 20 mmol/L    Urea Nitrogen 15 6 - 23 mg/dL    Creatinine 0.53 0.50 - 1.05 mg/dL    eGFR >90 >60 mL/min/1.73m*2    Calcium 9.4 8.6 - 10.6 mg/dL    Albumin 4.1 3.4 - 5.0 g/dL    Alkaline Phosphatase 49 33 - 136 U/L    Total Protein 6.5 6.4 - 8.2 g/dL    AST 18 9 - 39 U/L    Bilirubin, Total 0.3 0.0 - 1.2 mg/dL    ALT 13 7 - 45 U/L   Troponin I, High Sensitivity    Collection Time: 07/20/25 10:58 PM   Result Value Ref Range    Troponin I, High Sensitivity (CMC) 5 0 - 34 ng/L   Coagulation Screen    Collection Time: 07/20/25 10:58 PM   Result Value Ref Range    Protime 11.2 9.8 - 12.4 seconds    INR 1.0 0.9 - 1.1    aPTT 28 26 - 36 seconds   Type and Screen    Collection Time: 07/20/25 10:58 PM   Result Value Ref Range    ABO TYPE O     Rh TYPE POS     ANTIBODY SCREEN NEG    Lipid Panel     Collection Time: 07/20/25 10:58 PM   Result Value Ref Range    Cholesterol 214 (H) 0 - 199 mg/dL    HDL-Cholesterol 55.4 mg/dL    Cholesterol/HDL Ratio 3.9     LDL Calculated 122 (H) <=99 mg/dL    VLDL 37 0 - 40 mg/dL    Triglycerides 184 (H) 0 - 149 mg/dL    Non HDL Cholesterol 159 (H) 0 - 149 mg/dL   Hemoglobin A1c    Collection Time: 07/20/25 10:58 PM   Result Value Ref Range    Hemoglobin A1C 5.1 See comment %    Estimated Average Glucose 100 Not Established mg/dL   B-Type Natriuretic Peptide    Collection Time: 07/20/25 10:58 PM   Result Value Ref Range    BNP 12 0 - 99 pg/mL   Urinalysis with Reflex Culture and Microscopic    Collection Time: 07/21/25  7:06 AM   Result Value Ref Range    Color, Urine Light-Yellow Light-Yellow, Yellow, Dark-Yellow    Appearance, Urine Clear Clear    Specific Gravity, Urine >1.050 (N) 1.005 - 1.035    pH, Urine 6.5 5.0, 5.5, 6.0, 6.5, 7.0, 7.5, 8.0    Protein, Urine 10 (TRACE) NEGATIVE, 10 (TRACE), 20 (TRACE) mg/dL    Glucose, Urine Normal Normal mg/dL    Blood, Urine NEGATIVE NEGATIVE mg/dL    Ketones, Urine NEGATIVE NEGATIVE mg/dL    Bilirubin, Urine NEGATIVE NEGATIVE mg/dL    Urobilinogen, Urine Normal Normal mg/dL    Nitrite, Urine NEGATIVE NEGATIVE    Leukocyte Esterase, Urine NEGATIVE NEGATIVE   Extra Urine Gray Tube    Collection Time: 07/21/25  7:06 AM   Result Value Ref Range    Extra Tube     Urinalysis Microscopic    Collection Time: 07/21/25  7:06 AM   Result Value Ref Range    WBC, Urine 1-5 1-5, NONE /HPF    RBC, Urine NONE NONE, 1-2, 3-5 /HPF    Mucus, Urine FEW Reference range not established. /LPF   Transthoracic Echo Complete    Collection Time: 07/21/25  3:00 PM   Result Value Ref Range    AV pk jossue 1.63 m/s    LVOT diam 2.20 cm    MV E/A ratio 1.09     LA vol index A/L 18.0 ml/m2    Tricuspid annular plane systolic excursion 2.5 cm    LV EF 73 %    RV free wall pk S' 17.90 cm/s    LVIDd 4.30 cm    Aortic Valve Area by Continuity of Peak Velocity 3.19 cm2     AV pk grad 11 mmHg    LV A4C EF 71.2               Past History   Medical History[1]  Surgical History[2]  Social History[3]      Medications/Allergies     Previous Medications    ACETAMINOPHEN (TYLENOL) 325 MG TABLET    Take 2 tablets (650 mg) by mouth every 4 hours if needed for fever (temp greater than 38.0 C) or moderate pain (4 - 6) (greater than or equal to 38 C).    AZELASTINE (ASTELIN) 137 MCG (0.1 %) NASAL SPRAY    Administer 1 spray into each nostril 2 times a day. Use in each nostril as directed    CHOLECALCIFEROL (VITAMIN D-3) 25 MCG (1000 UT) CAPSULE    Take 1 tablet (50,000 Units) by mouth 1 (one) time per week.    EZETIMIBE (ZETIA) 10 MG TABLET    Take 1 tablet (10 mg) by mouth once daily.    FLUTICASONE (FLONASE) 50 MCG/ACTUATION NASAL SPRAY    Administer 1 spray into each nostril 2 times a day. Shake gently. Before first use, prime pump. After use, clean tip and replace cap.    SERTRALINE (ZOLOFT) 100 MG TABLET    Take 1 tablet (100 mg) by mouth once daily.    TIRZEPATIDE, WEIGHT LOSS, 2.5 MG/0.5 ML SOLUTION    Inject 2.5 mg under the skin every 7 days.     Allergies[4]      Review of Systems  All systems reviewed and otherwise negative, except as stated above in HPI.    Diagnostics reviewed by COLLIN Mooney-CNP     Labs:  Results for orders placed or performed during the hospital encounter of 07/20/25   POCT GLUCOSE    Collection Time: 07/20/25  9:13 PM   Result Value Ref Range    POCT Glucose 118 (H) 74 - 99 mg/dL   ECG 12 lead    Collection Time: 07/20/25  9:15 PM   Result Value Ref Range    Ventricular Rate 63 BPM    Atrial Rate 63 BPM    NV Interval 194 ms    QRS Duration 68 ms    QT Interval 406 ms    QTC Calculation(Bazett) 415 ms    P Axis 79 degrees    R Axis 83 degrees    T Axis 84 degrees    QRS Count 10 beats    Q Onset 226 ms    P Onset 129 ms    P Offset 178 ms    T Offset 429 ms    QTC Fredericia 412 ms   CBC    Collection Time: 07/20/25 10:58 PM   Result Value Ref Range    WBC  5.8 4.4 - 11.3 x10*3/uL    nRBC 0.0 0.0 - 0.0 /100 WBCs    RBC 4.49 4.00 - 5.20 x10*6/uL    Hemoglobin 13.5 12.0 - 16.0 g/dL    Hematocrit 39.2 36.0 - 46.0 %    MCV 87 80 - 100 fL    MCH 30.1 26.0 - 34.0 pg    MCHC 34.4 32.0 - 36.0 g/dL    RDW 12.9 11.5 - 14.5 %    Platelets 171 150 - 450 x10*3/uL   Comprehensive metabolic panel    Collection Time: 07/20/25 10:58 PM   Result Value Ref Range    Glucose 110 (H) 74 - 99 mg/dL    Sodium 140 136 - 145 mmol/L    Potassium 3.6 3.5 - 5.3 mmol/L    Chloride 106 98 - 107 mmol/L    Bicarbonate 27 21 - 32 mmol/L    Anion Gap 11 10 - 20 mmol/L    Urea Nitrogen 15 6 - 23 mg/dL    Creatinine 0.53 0.50 - 1.05 mg/dL    eGFR >90 >60 mL/min/1.73m*2    Calcium 9.4 8.6 - 10.6 mg/dL    Albumin 4.1 3.4 - 5.0 g/dL    Alkaline Phosphatase 49 33 - 136 U/L    Total Protein 6.5 6.4 - 8.2 g/dL    AST 18 9 - 39 U/L    Bilirubin, Total 0.3 0.0 - 1.2 mg/dL    ALT 13 7 - 45 U/L   Troponin I, High Sensitivity    Collection Time: 07/20/25 10:58 PM   Result Value Ref Range    Troponin I, High Sensitivity (CMC) 5 0 - 34 ng/L   Coagulation Screen    Collection Time: 07/20/25 10:58 PM   Result Value Ref Range    Protime 11.2 9.8 - 12.4 seconds    INR 1.0 0.9 - 1.1    aPTT 28 26 - 36 seconds   Type and Screen    Collection Time: 07/20/25 10:58 PM   Result Value Ref Range    ABO TYPE O     Rh TYPE POS     ANTIBODY SCREEN NEG    Lipid Panel    Collection Time: 07/20/25 10:58 PM   Result Value Ref Range    Cholesterol 214 (H) 0 - 199 mg/dL    HDL-Cholesterol 55.4 mg/dL    Cholesterol/HDL Ratio 3.9     LDL Calculated 122 (H) <=99 mg/dL    VLDL 37 0 - 40 mg/dL    Triglycerides 184 (H) 0 - 149 mg/dL    Non HDL Cholesterol 159 (H) 0 - 149 mg/dL   Hemoglobin A1c    Collection Time: 07/20/25 10:58 PM   Result Value Ref Range    Hemoglobin A1C 5.1 See comment %    Estimated Average Glucose 100 Not Established mg/dL   B-Type Natriuretic Peptide    Collection Time: 07/20/25 10:58 PM   Result Value Ref Range    BNP 12  0 - 99 pg/mL   Urinalysis with Reflex Culture and Microscopic    Collection Time: 07/21/25  7:06 AM   Result Value Ref Range    Color, Urine Light-Yellow Light-Yellow, Yellow, Dark-Yellow    Appearance, Urine Clear Clear    Specific Gravity, Urine >1.050 (N) 1.005 - 1.035    pH, Urine 6.5 5.0, 5.5, 6.0, 6.5, 7.0, 7.5, 8.0    Protein, Urine 10 (TRACE) NEGATIVE, 10 (TRACE), 20 (TRACE) mg/dL    Glucose, Urine Normal Normal mg/dL    Blood, Urine NEGATIVE NEGATIVE mg/dL    Ketones, Urine NEGATIVE NEGATIVE mg/dL    Bilirubin, Urine NEGATIVE NEGATIVE mg/dL    Urobilinogen, Urine Normal Normal mg/dL    Nitrite, Urine NEGATIVE NEGATIVE    Leukocyte Esterase, Urine NEGATIVE NEGATIVE   Extra Urine Gray Tube    Collection Time: 07/21/25  7:06 AM   Result Value Ref Range    Extra Tube     Urinalysis Microscopic    Collection Time: 07/21/25  7:06 AM   Result Value Ref Range    WBC, Urine 1-5 1-5, NONE /HPF    RBC, Urine NONE NONE, 1-2, 3-5 /HPF    Mucus, Urine FEW Reference range not established. /LPF   Transthoracic Echo Complete    Collection Time: 07/21/25  3:00 PM   Result Value Ref Range    AV pk jossue 1.63 m/s    LVOT diam 2.20 cm    MV E/A ratio 1.09     LA vol index A/L 18.0 ml/m2    Tricuspid annular plane systolic excursion 2.5 cm    LV EF 73 %    RV free wall pk S' 17.90 cm/s    LVIDd 4.30 cm    Aortic Valve Area by Continuity of Peak Velocity 3.19 cm2    AV pk grad 11 mmHg    LV A4C EF 71.2      Radiographs:  MR neck soft tissue only wo IV contrast   Final Result   MR BRAIN:   No evidence of acute infarct, intracranial mass effect or midline   shift.        MR NECK:        Beaded appearance and focal dissections of the bilateral mid to   distal portions of the internal carotid arteries consistent with the   findings on CT angio head and neck from 07/21/2025 and suggestive of   localized dissections with suspected underlying fibromuscular   dysplasia. There is no evidence of abnormal crescentic signal on T1    fat-suppressed imaging to suggest intramural hematoma.        I personally reviewed the images/study and I agree with the findings   as stated by resident Ghassan Lyman. This study was interpreted   at [place], [place].        MACRO:   None        Signed by: Rolo Bain 7/21/2025 6:24 PM   Dictation workstation:   [id number]      MR brain wo IV contrast   Final Result   MR BRAIN:   No evidence of acute infarct, intracranial mass effect or midline   shift.        MR NECK:        Beaded appearance and focal dissections of the bilateral mid to   distal portions of the internal carotid arteries consistent with the   findings on CT angio head and neck from 07/21/2025 and suggestive of   localized dissections with suspected underlying fibromuscular   dysplasia. There is no evidence of abnormal crescentic signal on T1   fat-suppressed imaging to suggest intramural hematoma.        I personally reviewed the images/study and I agree with the findings   as stated by resident Ghassan Lyman. This study was interpreted   at [place], [place].        MACRO:   None        Signed by: Rolo Bain 7/21/2025 6:24 PM   Dictation workstation:   [id number]      Transthoracic Echo Complete   Final Result      CT angio head and neck w and wo IV contrast   Final Result   CTA neck:   1. The mid cervical segment of the left internal carotid artery has   localized ectasia and focal dissection consistent with fibromuscular   dysplasia. The carotid bulb and more proximal cervical segment also   have low-density non stenotic plaque versus localized beading as well.   2. The mid cervical and distal cervical segments of the right   internal carotid artery have beading and localized dissection   consistent with fibromuscular dysplasia.   3. The proximal left vertebral artery is partially obscured by   artifact from adjacent contrast in  "the venous systems; otherwise the   vertebral arteries are normal.             CTA head:   No evidence for significant stenosis or large branch vessel cutoffs   of the intracranial vessels.        I personally reviewed the images/study and I agree with the findings   as stated by Reynold Hoyos MD (PGY-3). This study was interpreted at   [facility], [place], [place].        MACRO:   Dr. Reynold Hoyos discussed the significance and urgency of this   critical finding by EPIC secure chat with Dr. NICCI MARTELL on   7/21/2025 at 6:26 am.  (**-RCF-**) Findings:  See findings.             Signed by: Anderson Gauthier 7/21/2025 7:37 AM   Dictation workstation:   [id number]      CT head wo IV contrast   Final Result   No acute intracranial abnormality.             MACRO:   None        Signed by: Josie Anton 7/20/2025 11:18 PM   Dictation workstation:   [id number]              Physical Exam     Visit Vitals  /78   Pulse 65   Temp 36.4 °C (97.5 °F)   Resp 18   Ht 1.676 m (5' 6\")   Wt 64.4 kg (142 lb)   SpO2 94%   BMI 22.92 kg/m²   OB Status Postmenopausal   Smoking Status Never   BSA 1.73 m²         Physical Exam:    Appearance: Alert, oriented , cooperative,  in no acute distress. Well nourished & well hydrated.    Skin: Intact,  dry skin, no lesions, rash, petechiae or purpura.     Eyes: PERRLA, EOMs intact.    ENT: Hearing grossly intact. External auditory canals patent, tympanic membranes intact with visible landmarks. Nares patent, mucus membranes moist. Dentition without lesions. Pharynx clear, uvula midline.     Neck: Supple, without meningismus.     Pulmonary: Clear bilaterally with good chest wall excursion. No rales, rhonchi or wheezing. No accessory muscle use or stridor.    Cardiac: Normal S1, S2 without murmur, rub, gallop or extrasystole. No JVD, Carotids without bruits.    Abdomen: Soft, nontender, active bowel sounds.  No palpable organomegaly.  No rebound or guarding. "      Musculoskeletal: Full range of motion. no pain, edema, or deformity. Pulses full and equal. No cyanosis, clubbing, or edema.    Neurological: Normal sensation, no weakness, no focal findings identified.    Psychiatric: Appropriate mood and affect.     Consultants  1) Neuro stroke      Impression and Plan    In summary, Chiquis Howard has been cared for according to the standard [facility] Clinical Decision Unit observation protocol for Bilateral carotid artery dissection (Multi). This extended period of observation was specifically required to determine the need for hospitalization. Prior to discharge from observation, the final physical exam is documented above. I have reviewed the results of the labs and imaging that were performed in the ED as well as the CDU.      Significant events during the course of observation based on the goals of the clinical problem list include:   1) Improvement of symptoms  2) Resolution of symptoms    Based on the patient's condition and test results, the patient will be discharged home.    Date and Time of Disposition.   Discharge: 7/21/25      Dr. Rendon is the CDU disposition attending.      Discharge Diagnosis  Bilateral carotid artery dissection (Multi)    Issues Requiring Follow-Up  Outpatient follow up with Neurology    Discharge Meds     Your medication list        START taking these medications        Instructions Last Dose Given Next Dose Due   aspirin 81 mg chewable tablet      Chew and swallow 1 tablet (81 mg) once daily.       clopidogrel 75 mg tablet  Commonly known as: Plavix      Take 1 tablet (75 mg) by mouth once daily.              ASK your doctor about these medications        Instructions Last Dose Given Next Dose Due   acetaminophen 325 mg tablet  Commonly known as: Tylenol      Take 2 tablets (650 mg) by mouth every 4 hours if needed for fever (temp greater than 38.0 C) or moderate pain (4 - 6) (greater than or equal to 38 C).        azelastine 137 mcg (0.1 %) nasal spray  Commonly known as: Astelin           cholecalciferol 1.25 mg (50,000 units) tablet  Commonly known as: Vitamin D-3           ezetimibe 10 mg tablet  Commonly known as: Zetia      Take 1 tablet (10 mg) by mouth once daily.       fluticasone 50 mcg/actuation nasal spray  Commonly known as: Flonase      Administer 1 spray into each nostril 2 times a day. Shake gently. Before first use, prime pump. After use, clean tip and replace cap.       sertraline 100 mg tablet  Commonly known as: Zoloft      Take 1 tablet (100 mg) by mouth once daily.       tirzepatide (weight loss) 2.5 mg/0.5 mL solution                     Where to Get Your Medications        You can get these medications from any pharmacy    Bring a paper prescription for each of these medications  aspirin 81 mg chewable tablet  clopidogrel 75 mg tablet         Test Results Pending At Discharge  Pending Labs       No current pending labs.            Hospital Course   Patient remained stable while in the CDU.  Echo was completed.  CONCLUSIONS:   1. The left ventricular systolic function is normal with a visually estimated ejection fraction of 70-75%.   2. There is no evidence of left ventricular hypertrophy.   3. There is normal right ventricular global systolic function.   4. The pulmonary artery is not well visualized.   5. There is no evidence of a patent foramen ovale.  MRI was completed:  IMPRESSION:  MR BRAIN:  No evidence of acute infarct, intracranial mass effect or midline  shift.      MR NECK:      Beaded appearance and focal dissections of the bilateral mid to  distal portions of the internal carotid arteries consistent with the  findings on CT angio head and neck from 07/21/2025 and suggestive of  localized dissections with suspected underlying fibromuscular  dysplasia. There is no evidence of abnormal crescentic signal on T1  fat-suppressed imaging to suggest intramural hematoma.  Discussed findings with the  patient. Patient had further questions regarding treatment plan, restrictions and was hoping to speak to Neurology team. Neurology team states they will talk to the patient soon. Advised the patient to follow up with Neurology outpatient. Patient was started on Asa 81 mg PO and Plavix 75 mg PO. Patient received prescriptions for ASA 81 mg PO and Plavix 75 mg PO. Advised the patient to return to the emergency department with worsening symptoms.    Pertinent Physical Exam At Time of Discharge  Physical Exam    Outpatient Follow-Up  Future Appointments   Date Time Provider Department Center   12/17/2025  9:40 AM DANYA Arana [id number]       Medical Screening Exam: The patient has received a medical screening examination and within reasonable clinical confidence an emergency medical condition was identified and has been stabilized.  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    Contusion of left hand, initial encounter 10/04/2017    Contusion of left hand including fingers, initial encounter    Encounter for general adult medical examination without abnormal findings 02/16/2017    Annual physical exam    Impacted cerumen, right ear 10/29/2021    Impacted cerumen of right ear    Other conditions influencing health status     Recent Weight Gain Involuntary    Other specified disorders of right ear 10/29/2021    Fullness in ear, right    Pain in right hip 08/26/2021    Right hip pain    Pain in right hip 01/09/2015    Hip pain, bilateral    Personal history of neoplasm of uncertain behavior 01/27/2014    History of neoplasm of bladder    Personal history of other diseases of the respiratory system 03/03/2015    History of acute sinusitis    Personal history of other infectious and parasitic diseases 11/24/2015    History of  viral exanthem    Personal history of other specified conditions 08/25/2017    History of abdominal pain    Personal history of other specified conditions 01/27/2014    History of gross hematuria    Personal history of other specified conditions 08/14/2017    History of nausea and vomiting    Unspecified injury of left wrist, hand and finger(s), initial encounter 10/04/2017    Injury of left hand, initial encounter   [2]   Past Surgical History:  Procedure Laterality Date    LAPAROSCOPY DIAGNOSTIC / BIOPSY / ASPIRATION / LYSIS  04/07/2015    Exploratory Laparoscopy    OTHER SURGICAL HISTORY  04/07/2015    Vaginal Hysterectomy With Colpectomy    TONSILLECTOMY  04/07/2015    Tonsillectomy With Adenoidectomy   [3]   Social History  Socioeconomic History    Marital status:     Number of children: 3   Tobacco Use    Smoking status: Never     Passive exposure: Never    Smokeless tobacco: Never   Substance and Sexual Activity    Alcohol use: Yes     Comment: occasionally    Drug use: Never    Sexual activity: Not Currently     Partners: Male     Birth control/protection: Post-menopausal     Social Drivers of Health     Financial Resource Strain: Low Risk  (1/17/2025)    Overall Financial Resource Strain (CARDIA)     Difficulty of Paying Living Expenses: Not hard at all   Food Insecurity: No Food Insecurity (1/17/2025)    Hunger Vital Sign     Worried About Running Out of Food in the Last Year: Never true     Ran Out of Food in the Last Year: Never true   Transportation Needs: No Transportation Needs (1/17/2025)    PRAPARE - Transportation     Lack of Transportation (Medical): No     Lack of Transportation (Non-Medical): No   Intimate Partner Violence: Not At Risk (1/17/2025)    Humiliation, Afraid, Rape, and Kick questionnaire     Fear of Current or Ex-Partner: No     Emotionally Abused: No     Physically Abused: No     Sexually Abused: No   Housing Stability: Low Risk  (1/17/2025)    Housing Stability Vital Sign      Unable to Pay for Housing in the Last Year: No     Number of Times Moved in the Last Year: 0     Homeless in the Last Year: No   [4]   Allergies  Allergen Reactions    Iodinated Contrast Media Hives and Shortness of breath     1998, pt states breathing was affected and hives   CT contrast in 1998.    Statins-Hmg-Coa Reductase Inhibitors Diarrhea

## 2025-07-21 NOTE — PROGRESS NOTES
Emergency Department Transition of Care Note       Signout   I received Chiquis Howard in signout from Dr. Archer.  Please see the ED Provider Note for all HPI, PE and MDM up to the time of signout at 0700.  This is in addition to the primary record.    In brief Chiquis Howard is an 64 y.o. female presenting for headache, neck pain and slurred speech.  Patient had CT head and CTA neck which showed bilateral internal carotid dissection.    At the time of signout we were awaiting:  Neurology recommendations    ED Course & Medical Decision Making   Medical Decision Making:  Under my care, neurology recommended further workup with MRI and TTE with consideration of starting patient on DAPT.  They wondered if patient could be admitted to CDU follow waiting further workup.  I discussed with CDU and they accepted the patient.  MRIs ordered.  Patient no longer having dizziness at this time.  Neurology will continue to follow while in the CDU.  She is otherwise remained hemodynamically stable during this time.    ED Course:  ED Course as of 07/21/25 1222   Sun Jul 20, 2025   4261 Attending summary:  65 y/o F with PMHx HLD who is presenting for headache, neck pain and slurred speech. Around 2030 tonight, she had sudden onset neck pain that radiated to her head. Called her neighbor who came over and stated she had slurred speech and slowness to respond. Has slowly improved, was resolved by time she got to the ED. Still has mild HA. Reports 1 week of episodes of an abnormal sensation of her L neck and ear, and difficulty with balance. States it doesn't feel like vertigo. It has happened before while driving and she can't stay in straight line. Also reports a loud pulsating in L ear. No chiropractor or neck manipulation. Evaluated in triage, NIHSS 0, no BAT. Vitals unremarkable. On exam, AO x4, NIHSS 0. No ataxia or dysmetria. No carotid bruit.     With sudden onset of HA, concern for sentinel bleed and will obtain CT head. Will  be within 6 hours window. Also concerned for arterial dissection or aneurysm with her symptoms today and for the last week. Has contrast allergy, will do accelerated prep for CTA head and neck. ABCD2 score 3 so low risk. Dispo pending work-up. [SS]   Mon Jul 21, 2025   0113 CT head without showed no signs of acute intracranial abnormality.  [JW]   0347 Comprehensive metabolic panel(!)  Shows evidence of hyperglycemia. Otherwise unremarkable.  [JW]   0347 CBC  Unremarkable [JW]   0347 Troponin I, High Sensitivity  Negative troponin [JW]   0701 CTA neck showed dissections of bilateral internal carotid arteries.  [JW]      ED Course User Index  [JW] Jazmin Archer MD  [SS] Ruth West MD         Diagnoses as of 07/21/25 1222   Dizziness   Bilateral carotid artery dissection (Multi)       Disposition   As a result of their workup, the patient will require admission to the CDU.  The patient was informed of her diagnosis.  The patient was given the opportunity to ask questions and I answered them. The patient agreed to be admitted to the CDU.    Procedures   Procedures        Keron Durbin DO  Emergency Medicine  PGY-2

## 2025-07-21 NOTE — PROGRESS NOTES
Emergency Department Transition of Care Note       Signout   I received Chiquis Howard in signout from Dr. Esteban Bowers.  Please see the ED Provider Note for all HPI, PE and MDM up to the time of signout at 2300.  This is in addition to the primary record.    In brief Chiquis Howard is an 64 y.o. female presenting for dizziness, numbness, left sided neck pain.     At the time of signout we were awaiting:  CT head without contrast and CTA head and neck.     ED Course & Medical Decision Making   Medical Decision Making:  Under my care, the CT head without contrast shows no acute intracranial abnormality. CTA shows bilateral internal carotid dissection. Neurology was consulted for further recommendations.     ED Course:  ED Course as of 07/21/25 0728   Sun Jul 20, 2025 2239 Attending summary:  65 y/o F with PMHx HLD who is presenting for headache, neck pain and slurred speech. Around 2030 tonight, she had sudden onset neck pain that radiated to her head. Called her neighbor who came over and stated she had slurred speech and slowness to respond. Has slowly improved, was resolved by time she got to the ED. Still has mild HA. Reports 1 week of episodes of an abnormal sensation of her L neck and ear, and difficulty with balance. States it doesn't feel like vertigo. It has happened before while driving and she can't stay in straight line. Also reports a loud pulsating in L ear. No chiropractor or neck manipulation. Evaluated in triage, NIHSS 0, no BAT. Vitals unremarkable. On exam, AO x4, NIHSS 0. No ataxia or dysmetria. No carotid bruit.     With sudden onset of HA, concern for sentinel bleed and will obtain CT head. Will be within 6 hours window. Also concerned for arterial dissection or aneurysm with her symptoms today and for the last week. Has contrast allergy, will do accelerated prep for CTA head and neck. ABCD2 score 3 so low risk. Dispo pending work-up. [SS]   Mon Jul 21, 2025   0113 CT head without showed no  signs of acute intracranial abnormality.  [JW]   0347 Comprehensive metabolic panel(!)  Shows evidence of hyperglycemia. Otherwise unremarkable.  [JW]   0347 CBC  Unremarkable [JW]   0347 Troponin I, High Sensitivity  Negative troponin [JW]   0701 CTA neck showed dissections of bilateral internal carotid arteries.  [JW]      ED Course User Index  [JW] Jazmin Archer MD  [SS] Ruth West MD       Disposition   This patient was discussed and signed out to Dr. Keron Durbin at 0700.     Procedures   Procedures    Patient seen and discussed with ED attending physician.    Jazmin Arcehr MD  Emergency Medicine

## 2025-07-21 NOTE — ED PROVIDER NOTES
Emergency Department Provider Note       History of Present Illness     History provided by: Patient and Neighbor  Limitations to History: None  External Records Reviewed with Brief Summary: Outpatient progress note from 6/24/2025 which showed otologic symptoms 2/2 cerumen impaction    HPI:  Chiquis Howard is a 64 y.o. female with history of HLD presenting to the emergency department after an episode of dizziness that occurred earlier this evening around 8:30p.  Patient says she was in her home when suddenly she felt dizzy and a sudden onset tingling sensation along the posterior left side of her neck that radiated up to her head. Patient says she also had associated tingling on the left side of her face and a residual headache that has [persisted in the ED, although the tingling has resolved. Patient denies any activity, including head turning or going from sitting to standing preceding her symptoms. Patient denies any fever/chills, chest pain, shortness of breath, dysuria, or new neurologic symptoms in ED like numbness, weakness, or tingling.   Patient presented to the ED after being driven by her neighbor who noted patient had what she thought was slurred speech on arrival and difficulty word finding. She states the patients symptoms have since resolved however she never saw her like that before.  Patient is not on blood thinners.    Physical Exam   Triage vitals:  T 36.4 °C (97.5 °F)  HR 64  /83  RR 16  O2 96 % None (Room air)    General: Awake, alert, in no acute distress  Eyes: Gaze conjugate.  No scleral icterus or injection. PERRL. EOMI without nystagmus   HENT: Normo-cephalic, atraumatic. No stridor  CV: Regular rate, regular rhythm. Radial pulses 2+ bilaterally  Resp: Breathing non-labored, speaking in full sentences.  Clear to auscultation bilaterally  GI: Soft, non-distended, non-tender. No rebound or guarding.  MSK/Extremities: No gross bony deformities. Moving all extremities  Skin: Warm.  Appropriate color  Neuro: Alert. Oriented. Face symmetric. Speech is fluent. CN 2-12 intact. Gross strength +5/+5 and sensation intact in b/l UE and LE 's.  strength +5 bilaterally. Good heel to shin bilaterally.  Psych: Appropriate mood and affect      Medical Decision Making & ED Course   Medical Decision Makin y.o. female with pertinent history of HLD presenting to the emergency department for dizziness had headache that began around 8:30p today. Patient stable on arrival and with no explicit stroke-like-symptoms. On history patient endorsed a sudden left sided neck pain that radiates to the back of the head. PE neuro exam negative patient NIH score was 0. However based on symptoms workup for TIA vs CVA vs SAH vs ICH vs vertebral artery dissection in progress with negative findings commented on in ED course. Patient had allergy to iodine so she was started on methypred/benadryl prep course and put in for immediate CT non con with CTA head neck post prep.   Contacted CT and told to hold off on prep until they speak to radiologist, requested STAT CT non-con.  CT confirmed to go ahead with prep and will get CT non-con, CTA in 4-5 hours following prep.  Based on patient history I believe its likely patient suffered a potential TIA based on slurred speech and left sided numbness with complete resolution shortly after symptom onset. However I believe the nature of the left sided neck pain traveling proximally to patients head with associated dizziness means vertebral artery dissection and SAH must be ruled out with CT.  ----  NIH Stroke Scale: 0  NIHSS - 0    Differential diagnoses considered include but are not limited to: TIA vs CVA vs SAH vs vertebral artery dissection vs ICH vs vertigo    Social Determinants of Health which Significantly Impact Care: Social Determinants of Health which Significantly Impact Care: None identified     EKG Independent Interpretation: The EKG obtained at  was  independently interpreted by myself. It demonstrates normal sinus rhythm with a ventricular rate of 63bpm. Normal axis. Intervals normal. ST segments showed no acute ischemic changes. Current EKG compared to prior EKG from 3/26/2019 with no appreciable differences     Independent Result Review and Interpretation: Relevant laboratory and radiographic results were reviewed and independently interpreted by myself.  As necessary, they are commented on in the ED Course.    Chronic conditions affecting the patient's care: As documented above in [facility]    The patient was discussed with the following consultants/services:   Discussed with CT for STAT access to imaging while in 6 hr SAH window    Care Considerations: As documented above in [facility]    ED Course:  ED Course as of 07/22/25 1657   Sun Jul 20, 2025   1254 Attending summary:  65 y/o F with PMHx HLD who is presenting for headache, neck pain and slurred speech. Around 2030 tonight, she had sudden onset neck pain that radiated to her head. Called her neighbor who came over and stated she had slurred speech and slowness to respond. Has slowly improved, was resolved by time she got to the ED. Still has mild HA. Reports 1 week of episodes of an abnormal sensation of her L neck and ear, and difficulty with balance. States it doesn't feel like vertigo. It has happened before while driving and she can't stay in straight line. Also reports a loud pulsating in L ear. No chiropractor or neck manipulation. Evaluated in triage, NIHSS 0, no BAT. Vitals unremarkable. On exam, AO x4, NIHSS 0. No ataxia or dysmetria. No carotid bruit.     With sudden onset of HA, concern for sentinel bleed and will obtain CT head. Will be within 6 hours window. Also concerned for arterial dissection or aneurysm with her symptoms today and for the last week. Has contrast allergy, will do accelerated prep for CTA head and neck. ABCD2 score 3 so low risk. Dispo pending work-up. [SS]   Mon Jul 21, 2025   0113  CT head without showed no signs of acute intracranial abnormality.  [JW]   0347 Comprehensive metabolic panel(!)  Shows evidence of hyperglycemia. Otherwise unremarkable.  [JW]   0347 CBC  Unremarkable [JW]   0347 Troponin I, High Sensitivity  Negative troponin [JW]   0701 CTA neck showed dissections of bilateral internal carotid arteries.  [JW]      ED Course User Index  [JW] Jazmin Archer MD  [SS] Ruth West MD         Diagnoses as of 07/22/25 1657   Dizziness   Bilateral carotid artery dissection (Multi)       Disposition   Patient was signed out to Jazmin Archer MD at 2316 pending completion of their work-up.  Please see the next provider's transition of care note for the remainder of the patient's care.     Patient seen and discussed with ED attending physician.    Ruth West MD  Emergency Medicine                                                       Estebanmanuel Bowers MD  Resident  07/20/25 2316       Ruth West MD  07/22/25 1700

## 2025-07-21 NOTE — ED TRIAGE NOTES
Pt coming in with dizziness X 1 week then today dizziness became worse and she had a sharp sudden onset of left sided head pain radiating down to neck, pt was feeling dizzy all day, per neighbor when she saw pt she had some slurred speech which has since resolved, NIH 0,

## 2025-07-21 NOTE — SIGNIFICANT EVENT
Patient was consulted by the neurology team who discharged the patient from their care.  Studies ordered by the CDU including the echo and MRI were unremarkable except for known dissection.  Neurology cleared patient to be discharged.  Patient was given aspirin and Plavix upon discharge, and patient discussed all questions with neurology prior to discharge. Referral to Neurology given, suggested follow up with PCP within 3-5 days.

## 2025-07-21 NOTE — PROGRESS NOTES
Pharmacy Medication History Review    Chiquis Howard is a 64 y.o. female admitted for Bilateral carotid artery dissection (Multi). Pharmacy reviewed the patient's vnyhz-eg-vlqfomdas medications and allergies for accuracy.    The list below reflects the updated PTA list.   Prior to Admission Medications   Prescriptions Last Dose Informant   acetaminophen (Tylenol) 325 mg tablet Past week Self   Sig: Take 2 tablets (650 mg) by mouth every 4 hours if needed for fever (temp greater than 38.0 C) or moderate pain (4 - 6) (greater than or equal to 38 C).   azelastine (Astelin) 137 mcg (0.1 %) nasal spray 7/20/2025 Self   Sig: Administer 1 spray into each nostril 2 times a day. Use in each nostril as directed   cholecalciferol (Vitamin D-3) 25 MCG (1000 UT) capsule  Self   Sig: Take 1 tablet (50,000 Units) by mouth 1 (one) time per week.   Patient not taking: Reported on 6/24/2025   ezetimibe (Zetia) 10 mg tablet Past Month Self   Sig: Take 1 tablet (10 mg) by mouth once daily.     Patient says she stopped taking medication 2 weeks ago    fluticasone (Flonase) 50 mcg/actuation nasal spray 7/20/2025 Self   Sig: Administer 1 spray into each nostril 2 times a day. Shake gently. Before first use, prime pump. After use, clean tip and replace cap.   sertraline (Zoloft) 100 mg tablet 7/21/2025 Self   Sig: Take 1 tablet (100 mg) by mouth once daily.    Took her home medication this AM in ED    tirzepatide, weight loss, 2.5 mg/0.5 mL solution 7/20/2025 Self   Sig: Inject 2.5 mg under the skin every 7 days on Tuesday           Facility-Administered Medications: None        The list below reflects the updated allergy list. Please review each documented allergy for additional clarification and justification.  Allergies  Reviewed by Denise Farris RN on 7/20/2025        Severity Reactions Comments    Iodinated Contrast Media High Hives, Shortness of breath 1998, pt states breathing was affected and hives   CT contrast in 1998.     Statins-hmg-coa Reductase Inhibitors Medium Diarrhea             Patient accepts M2B at discharge. Pharmacy has been updated to [facility].    Sources used to complete the med history include:    OAS  Pharmacy dispense history  Patient Interview Good historian  Chart Review  Care Everywhere   Office visit Otolaryngology   Asya Campos, APRN-CNP 6/24/25     Below are additional concerns with the patient's PTA list.  Patient said she stopped her ezetimibe 2 weeks ago. No reason given.    SEE OARRS BELOW   GABAPENTIN FOR AN ANIMAL       Medications ADDED:  azelastine (Astelin) 137 mcg (0.1 %) nasal spray  Medications CHANGED:  NONE   Medications REMOVED:   Ezetimibe (not taking)    [facility] Tr[facility] formerly [facility].   Transitions of Care Pharmacist    Please reach out via Secure Chat for questions, or if no response call [employer] or [facility]

## 2025-07-23 ENCOUNTER — PATIENT OUTREACH (OUTPATIENT)
Dept: PRIMARY CARE | Facility: CLINIC | Age: 65
End: 2025-07-23
Payer: COMMERCIAL

## 2025-07-23 NOTE — PROGRESS NOTES
Discharge Facility:[facility]  Discharge Diagnosis:Dizziness neck pain bilateral carotid dissection   Admission Date:7/20/25  Discharge Date: 7/21/25    PCP Appointment Date:7/30/25  Specialist Appointment Date:   Hospital Encounter and Summary Linked: Yes/ED with Jr Rendon MD MPH (07/20/2025)   See discharge assessment below for further details   Wrap Up  Wrap Up Additional Comments: discused discharge patient stated that she stated that she is doing better but still getting headaches. provided contact information encouraged call with questions. (7/23/2025  9:44 AM)    Engagement  Call Start Time: 0944 (7/23/2025  9:44 AM)    Medications  Medications reviewed with patient/caregiver?: Yes (asa 81 mg plAVIX 75MG) (7/23/2025  9:44 AM)  Is the patient having any side effects they believe may be caused by any medication additions or changes?: No (7/23/2025  9:44 AM)  Does the patient have all medications ordered at discharge?: Yes (7/23/2025  9:44 AM)  Is the patient taking all medications as directed (includes completed medication regime)?: Yes (7/23/2025  9:44 AM)    Appointments  Does the patient have a primary care provider?: Yes (7/23/2025  9:44 AM)  Care Management Interventions: Verified appointment date/time/provider (7/23/2025  9:44 AM)  Has the patient kept scheduled appointments due by today?: Yes (7/23/2025  9:44 AM)    Self Management  Has home health visited the patient within 72 hours of discharge?: Not applicable (7/23/2025  9:44 AM)  Has all Durable Medical Equipment (DME) been delivered?: No (7/23/2025  9:44 AM)    Patient Teaching  Does the patient have access to their discharge instructions?: Yes (7/23/2025  9:44 AM)  Care Management Interventions: Reviewed instructions with patient (7/23/2025  9:44 AM)  What is the patient's perception of their health status since discharge?: Improving (7/23/2025  9:44 AM)  Is the patient/caregiver able to teach back the hierarchy of who to call/visit for  symptoms/problems? PCP, Specialist, Home Health nurse, Urgent Care, ED, 911: Yes (7/23/2025  9:44 AM)

## 2025-07-24 ENCOUNTER — OFFICE VISIT (OUTPATIENT)
Dept: PRIMARY CARE | Facility: CLINIC | Age: 65
End: 2025-07-24
Payer: COMMERCIAL

## 2025-07-24 VITALS
BODY MASS INDEX: 23.14 KG/M2 | SYSTOLIC BLOOD PRESSURE: 118 MMHG | HEIGHT: 66 IN | DIASTOLIC BLOOD PRESSURE: 82 MMHG | OXYGEN SATURATION: 96 % | HEART RATE: 68 BPM | WEIGHT: 144 LBS

## 2025-07-24 DIAGNOSIS — I77.3 FIBROMUSCULAR DYSPLASIA OF BOTH CAROTID ARTERIES: Primary | ICD-10-CM

## 2025-07-24 DIAGNOSIS — E78.41 ELEVATED LIPOPROTEIN(A): ICD-10-CM

## 2025-07-24 DIAGNOSIS — F33.1 MODERATE RECURRENT MAJOR DEPRESSION: ICD-10-CM

## 2025-07-24 DIAGNOSIS — I77.71 CAROTID DISSECTION, BILATERAL (MULTI): ICD-10-CM

## 2025-07-24 PROCEDURE — 99496 TRANSJ CARE MGMT HIGH F2F 7D: CPT | Performed by: FAMILY MEDICINE

## 2025-07-24 PROCEDURE — 1036F TOBACCO NON-USER: CPT | Performed by: FAMILY MEDICINE

## 2025-07-24 PROCEDURE — 3008F BODY MASS INDEX DOCD: CPT | Performed by: FAMILY MEDICINE

## 2025-07-24 NOTE — PROGRESS NOTES
"Subjective   Patient ID: Chiquis Howard is a 64 y.o. female who presents for Hospital Follow-up.    HPI     The patient is intolerant of atorvastatin or simvastatin. She is taking aspirin, Plavix,  vit D, Zoloft for anxiety and depression as controlled and is tolerating them well. She is weaning off tirzepatide and is not taking Zetia. She is retiring in July 2025.       She was hospitalized from 7/20-7/21/2025 for posterior left neck pain and a headache. CT angio of the head and MRI showed bilateral carotid dissection and suggested fibromuscular dysplasia.  Echo was unremarkable. She was started on Asprin and Plavix. Advised to follow up with Neurology.      Blood pressure is stable at this time. She states she has been having pounding headaches all the time after starting Plavix. Headache is located at the back of her head.  Advised to take Tylenol and not ibuprofen or Advil. She mentions Tylenol is not helpful.      She endorses continuous clicking in one ear, worsens while lying down sometimes which is affecting her sleep.    Review of Systems  Constitutional: No fever or chills  ENT: +clicking in ear  Cardiovascular: no chest pain, no palpitations and no syncope.   Respiratory: no cough, no shortness of breath during exertion and no shortness of breath at rest.   Gastrointestinal: no abdominal pain, no nausea and no vomiting.  Neuro: + Headache, no dizziness    Objective   /82   Pulse 68   Ht 1.676 m (5' 6\")   Wt 65.3 kg (144 lb)   SpO2 96%   BMI 23.24 kg/m²     Physical Exam  Constitutional: Alert and in no acute distress. Well developed, well nourished  Head and Face: Head and face: Normal.    Cardiovascular: Heart rate and rhythm were normal, normal S1 and S2. No peripheral edema.   Pulmonary: No respiratory distress. Clear bilateral breath sounds.  Musculoskeletal: Gait and station: Normal. Muscle strength/tone: Normal.   Skin: Normal skin color and pigmentation, normal skin turgor, and no rash.  "   Psychiatric: Judgment and insight: Intact. Mood and affect: Normal.      Lab Results   Component Value Date    WBC 5.8 07/20/2025    HGB 13.5 07/20/2025    HCT 39.2 07/20/2025     07/20/2025    CHOL 214 (H) 07/20/2025    TRIG 184 (H) 07/20/2025    HDL 55.4 07/20/2025    ALT 13 07/20/2025    AST 18 07/20/2025     07/20/2025    K 3.6 07/20/2025     07/20/2025    CREATININE 0.53 07/20/2025    BUN 15 07/20/2025    CO2 27 07/20/2025    TSH 2.01 05/14/2025    INR 1.0 07/20/2025    HGBA1C 5.1 07/20/2025       MR brain wo IV contrast, MR neck soft tissue only wo IV contrast  Narrative: Interpreted By:  Rolo Bain and Ritchie Brandon   STUDY:  MR BRAIN WO IV CONTRAST; MR NECK SOFT TISSUE ONLY WO IV CONTRAST;  7/21/2025 5:12 pm; 7/21/2025 5:13 pm      INDICATION:  Signs/Symptoms:b/l carotid dissection,TIA protocol;  Signs/Symptoms:FAT SAT protocol, b/l carotid dissection, TIA workup.      COMPARISON:  CT head and CT angio head and neck 07/20/2025 and 07/21/2025.      ACCESSION NUMBER(S):  [id number]; [id number]      ORDERING CLINICIAN:  MEGA VINCENT      TECHNIQUE:  Multiplanar multisequence MR imaging was performed through the brain  without intravenous contrast. Additional T1 axial fat-suppressed  sequences of the neck were provided for review.      FINDINGS:  MR BRAIN:      No evidence of diffusion restriction abnormality to suggest acute  infarct. No evidence of focal parenchymal signal abnormality. No  intraparenchymal hemorrhage. There is no mass effect or midline shift.      The ventricles, sulci, and basal cisterns are age concordant. The  basal cisterns are patent. No evidence of abnormal extra-axial fluid  collections.      The intracranial flow voids are patent appearance with the cervical  vasculature further characterized as below.      The visualized paranasal sinuses and mastoids are clear.          MR NECK:      Left internal carotid: The midcervical segment of the left  internal  carotid artery there is localized ectasia, beaded appearance and a  focal intimal dissection flap corresponding with the findings from  the CT angio from 07/21/2025. There is no evidence of marked T1  hyperintense signal on T1 fat-suppressed imaging to suggest obvious  crescent sign/intramural hematoma formation.      Right internal carotid: There is redemonstration of the dissection  flap of the mid to distal cervical segment of the right internal  carotid artery with beaded appearance which corresponds with the  findings in the CT angio from 07/21/2025. There is no evidence of  marked T1 hyperintense signal on T1 fat-suppressed imaging to suggest  obvious crescent sign/intramural hematoma formation.      Vertebral arteries: Vertebral arteries appear patent without evidence  of obvious dissection flap or abnormal signal on T1 fat-suppressed  imaging to suggest intramural hematoma.      Impression: MR BRAIN:  No evidence of acute infarct, intracranial mass effect or midline  shift.      MR NECK:      Beaded appearance and focal dissections of the bilateral mid to  distal portions of the internal carotid arteries consistent with the  findings on CT angio head and neck from 07/21/2025 and suggestive of  localized dissections with suspected underlying fibromuscular  dysplasia. There is no evidence of abnormal crescentic signal on T1  fat-suppressed imaging to suggest intramural hematoma.      I personally reviewed the images/study and I agree with the findings  as stated by resident Ghassan Lyman. This study was interpreted  at [facility], [place], [place].      MACRO:  None      Signed by: Rolo Bain 7/21/2025 6:24 PM  Dictation workstation:   [id number]  Transthoracic Echo Complete     [facility] [address] [place] [address]                 Tel [phone] and Fax [phone]    TRANSTHORACIC ECHOCARDIOGRAM REPORT       Patient  Name:       LEIDY Khalil Physician:    [id number] Cory Carpenter MD  Study Date:         7/21/2025           Ordering Provider:    [id number] RODRÍGUEZ VOSS  MRN/PID:            [id number]            Fellow:  Accession#:         [id number]        Nurse:                Cecy Alvarado RN  Date of Birth/Age:  1960 / 64     Sonographer:          ELLIS Wood RDCS  Gender assigned at  F                   Additional Staff:  Birth:  Height:             167.64 cm           Admit Date:           7/20/2025  Weight:             64.41 kg            Admission Status:     Inpatient -                                                                Priority                                                                discharge  BSA / BMI:          1.73 m2 / 22.92     Encounter#:           [id number]                      kg/m2  Blood Pressure:     128/78 mmHg         Department Location:  [facility]                                                                Non Invasive    Study Type:    TRANSTHORACIC ECHO (TTE) COMPLETE  Diagnosis/ICD: Dizziness and giddiness-R42; Dissection of carotid artery-I77.71  Indication:    Carotid artery disection, dizziness  CPT Code:      Echo Complete w Full Doppler-09875    Patient History:  Pertinent History: 64 y.o. 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    Study Detail: The following Echo studies were performed: 2D, M-Mode, Doppler and                color flow. Agitated saline used as a contrast agent for                intraseptal flow evaluation. The patient was awake.       PHYSICIAN INTERPRETATION:  Left Ventricle: The left ventricular  systolic function is normal with a visually estimated ejection fraction of 70-75%. There are no regional left ventricular wall motion abnormalities. The left ventricular cavity size is normal. There is normal septal and normal posterior left ventricular wall thickness. There is no evidence of left ventricular hypertrophy. Spectral Doppler shows a normal pattern of left ventricular diastolic filling.  Left Atrium: The left atrial size is normal. There is no evidence of a patent foramen ovale. A bubble study using agitated saline was performed. Bubble study is negative.  Right Ventricle: The right ventricle is normal in size. There is normal right ventricular global systolic function.  Right Atrium: The right atrium is normal in size.  Aortic Valve: The aortic valve is trileaflet. There is no evidence of aortic valve regurgitation.  Mitral Valve: The mitral valve is normal in structure. There is no evidence of mitral valve regurgitation. The E Vmax is 0.76 m/s.  Tricuspid Valve: The tricuspid valve is structurally normal. There is trace tricuspid regurgitation. The right ventricular systolic pressure could not be estimated.  Pulmonic Valve: The pulmonic valve is structurally normal. There is physiologic pulmonic valve regurgitation.  Pericardium: Trivial pericardial effusion.  Aorta: The aortic root is normal.  Pulmonary Artery: The pulmonary artery is not well visualized.  Systemic Veins: The inferior vena cava appears normal in size, with IVC inspiratory collapse greater than 50%.  In comparison to the previous echocardiogram(s): There are no prior studies on this patient for comparison purposes.       CONCLUSIONS:   1. The left ventricular systolic function is normal with a visually estimated ejection fraction of 70-75%.   2. There is no evidence of left ventricular hypertrophy.   3. There is normal right ventricular global systolic function.   4. The pulmonary artery is not well visualized.   5. There is no  evidence of a patent foramen ovale.    QUANTITATIVE DATA SUMMARY:     2D MEASUREMENTS:          Normal Ranges:  Ao Root d:       3.00 cm  (2.0-3.7cm)  LAs:             3.70 cm  (2.7-4.0cm)  IVSd:            0.80 cm  (0.6-1.1cm)  LVPWd:           0.80 cm  (0.6-1.1cm)  LVIDd:           4.30 cm  (3.9-5.9cm)  LVIDs:           2.30 cm  LV Mass Index:   61 g/m2  LVEDV Index:     44 ml/m2  LV % FS          46.5 %       LEFT ATRIUM:                  Normal Ranges:  LA Vol A4C:        28.8 ml    (22+/-6mL/m2)  LA Vol A2C:        33.2 ml  LA Vol BP:         31.2 ml  LA Vol Index A4C:  16.7ml/m2  LA Vol Index A2C:  19.2 ml/m2  LA Vol Index BP:   18.0 ml/m2  LA Area A4C:       12.6 cm2  LA Area A2C:       13.4 cm2  LA Major Axis A4C: 4.7 cm  LA Major Axis A2C: 4.6 cm       RIGHT ATRIUM:                 Normal Ranges:  RA Vol A4C:        34.0 ml    (8.3-19.5ml)  RA Vol Index A4C:  19.7 ml/m2  RA Area A4C:       14.0 cm2  RA Major Axis A4C: 4.9 cm       M-MODE MEASUREMENTS:         Normal Ranges:  Ao Root:             3.00 cm (2.0-3.7cm)       AORTA MEASUREMENTS:         Normal Ranges:  Ao Sinus, d:        3.00 cm (2.1-3.5cm)  Asc Ao, d:          3.30 cm (2.1-3.4cm)       LV SYSTOLIC FUNCTION:                       Normal Ranges:  EF-A4C View:    71 % (>=55%)  EF-A2C View:    76 %  EF-Biplane:     73 %  EF-Visual:      73 %  EF-3DQ:         74 %  LV EF Reported: 73 %       LV DIASTOLIC FUNCTION:             Normal Ranges:  MV Peak E:             0.76 m/s    (0.7-1.2 m/s)  MV Peak A:             0.70 m/s    (0.42-0.7 m/s)  E/A Ratio:             1.09        (1.0-2.2)  MV e'                  0.100 m/s   (>8.0)  MV lateral e'          0.12 m/s  MV medial e'           0.08 m/s  MV A Dur:              135.00 msec  E/e' Ratio:            7.58        (<8.0)  a'                     0.15 m/s  PulmV Sys Herb:         78.10 cm/s  PulmV Miller Herb:        62.60 cm/s  PulmV S/D Herb:         1.20  PulmV A Revs Herb:      62.10 cm/s  PulmV A Revs  Dur:      153.00 msec       MITRAL VALVE:          Normal Ranges:  MV DT:        256 msec (150-240msec)       AORTIC VALVE:            Normal Ranges:  AoV Vmax:      1.63 m/s  (<=1.7m/s)  AoV Peak PG:   10.6 mmHg (<20mmHg)  LVOT Max Herb:  1.37 m/s  (<=1.1m/s)  LVOT VTI:      28.60 cm  LVOT Diameter: 2.20 cm   (1.8-2.4cm)  AoV Area,Vmax: 3.19 cm2  (2.5-4.5cm2)       RIGHT VENTRICLE:  RV Basal 4.90 cm  RV Mid   2.00 cm  RV Major 7.0 cm  TAPSE:   25.4 mm  RV s'    0.18 m/s       TRICUSPID VALVE/RVSP:         Normal Ranges:  Est. RA Pressure:     3  IVC Diam:             1.10 cm       PULMONIC VALVE:          Normal Ranges:  PV Max Herb:     0.8 m/s  (0.6-0.9m/s)  PV Max P.4 mmHg       PULMONARY VEINS:  PulmV A Revs Dur: 153.00 msec  PulmV A Revs Herb: 62.10 cm/s  PulmV Miller Herb:   62.60 cm/s  PulmV S/D Herb:    1.20  PulmV Sys Herb:    78.10 cm/s       [id number] Cory Carpenter MD  Electronically signed on 2025 at 5:14:23 PM       ** Final **  CT angio head and neck w and wo IV contrast  Narrative: Interpreted By:  Anderson Gauhtier and Omar Mahmoud   STUDY:  CT ANGIO HEAD AND NECK W AND WO IV CONTRAST;  2025 5:52 am      INDICATION:  Signs/Symptoms:sudden onset left sided headache,neckpain - concern  for vetrebral artery dissection.          COMPARISON:  CT head without IV contrast 2025.      ACCESSION NUMBER(S):  [id number]      ORDERING CLINICIAN:  NICCI MARTELL      TECHNIQUE:  Unenhanced CT images of the head were obtained. Subsequently, 75 ML  of Omnipaque 350 was administered intravenously and axial images of  the head and neck were acquired.  Coronal, sagittal, and 3-D  reconstructions were provided for review.  Carotid stenoses were  determined using modified NASCET criteria.      FINDINGS:  CT head:  The brain parenchyma is normal, unchanged. The ventricles and sulci  are normal.      No intracranial hemorrhage or extra-axial fluid collections.      Calvarium is normal. There is minimal fluid  within the right mastoid  air cells; otherwise, the visualized paranasal sinuses and left  mastoid air cells are clear. No loss of gray-white differentiation.          CTA HEAD FINDINGS:      Anterior circulation: The right carotid siphon has mild non stenotic  calcifications. The bilateral intracranial internal carotid arteries,  bilateral carotid terminals, bilateral proximal anterior and middle  cerebral arteries are otherwise normal.      Posterior circulation: A congenital variation is present with the  right posterior cerebral artery supplied through the posterior  communicating artery. Bilateral intracranial vertebral arteries,  vertebrobasilar junction, basilar artery and proximal posterior  cerebral arteries are normal.      CTA NECK FINDINGS:  Beam hardening artifact from intravenous contrast mildly limits the  evaluation of adjacent proximal cervical arteries.      Right carotid vessels: The mid cervical segment of the right internal  carotid artery has beading consistent with fibromuscular dysplasia.      The more distal portion of the cervical segment near the skull base  has focal outpouching with small flap consistent with localized  dissection.      The right common carotid artery is normal. There is 0% stenosis.          Left carotid vessels:      The mid cervical segment of the left internal carotid artery has  localized ectasia with focal dissection.      The carotid bulb and more proximal cervical segment have irregular  low-density plaque versus beading.      The left common carotid artery is normal. There is 0% stenosis.          Vertebral vessels: The proximal cervical segment of the left  vertebral artery is partially obscured by beam hardening artifact  from contrast in the adjacent veins.      The remaining vertebral arteries are normal with no dissection or  beading noted.      Impression: CTA neck:  1. The mid cervical segment of the left internal carotid artery has  localized ectasia and  focal dissection consistent with fibromuscular  dysplasia. The carotid bulb and more proximal cervical segment also  have low-density non stenotic plaque versus localized beading as well.  2. The mid cervical and distal cervical segments of the right  internal carotid artery have beading and localized dissection  consistent with fibromuscular dysplasia.  3. The proximal left vertebral artery is partially obscured by  artifact from adjacent contrast in the venous systems; otherwise the  vertebral arteries are normal.          CTA head:  No evidence for significant stenosis or large branch vessel cutoffs  of the intracranial vessels.      I personally reviewed the images/study and I agree with the findings  as stated by Reynold Hoyos MD (PGY-3). This study was interpreted at  [facility], [place], [place].      MACRO:  Dr. Reynold Hoyos discussed the significance and urgency of this  critical finding by [employer] secure chat with Dr. NICCI MARTELL on  7/21/2025 at 6:26 am.  (**-RCF-**) Findings:  See findings.          Signed by: Anderson Gauthier 7/21/2025 7:37 AM  Dictation workstation:   [id number]      Assessment & Plan  Fibromuscular dysplasia of both carotid arteries  Referral provided to Vascular medicine. Advised her to call and let me know if she has trouble with appointments.     Orders:    Referral to Cardiology; Future    Referral to Cardiology; Future    Carotid dissection, bilateral (Multi)  Continue Aspirin and Plavix  Orders:    Referral to Cardiology; Future    Referral to Cardiology; Future    Moderate recurrent major depression  Continue Zoloft at 150 mg.        Elevated lipoprotein(a)  LDL is 178. Elevated lipoprotein A. Recommended taking Zetia.  She is not on treatment right now. Will recheck numbers.              Your yearly Physical is due in: May 2026  When you call the office for your yearly Physical, please ask them to inform me to order your blood work, so that  you can get the fasting blood work before your appointment and we can discuss the results at your physical.      Please call me if any questions arise from now until your next visit. I will call you after I am done seeing patients. A Doctor is always available by phone when the office is closed. Please feel free to call for help with any problem that you feel shouldn't wait until the office re-opens.     I, Sara Ma MD, attest that this note for 7/24/2025 accurately reflects documentation that my scribe Charlene Magallon, made at my direction in my capacity as Sara Ma MD when I treated Chiquis Howard.      Scribe Attestation  By signing my name below, ICharlene Scribe   attest that this documentation has been prepared under the direction and in the presence of Sara Ma MD.

## 2025-07-24 NOTE — ASSESSMENT & PLAN NOTE
Continue Aspirin and Plavix  Orders:    Referral to Cardiology; Future    Referral to Cardiology; Future

## 2025-07-24 NOTE — ASSESSMENT & PLAN NOTE
Referral provided to Vascular medicine. Advised her to call and let me know if she has trouble with appointments.     Orders:    Referral to Cardiology; Future    Referral to Cardiology; Future

## 2025-07-24 NOTE — ASSESSMENT & PLAN NOTE
LDL is 178. Elevated lipoprotein A. Recommended taking Zetia.  She is not on treatment right now. Will recheck numbers.

## 2025-07-30 ENCOUNTER — APPOINTMENT (OUTPATIENT)
Dept: PRIMARY CARE | Facility: CLINIC | Age: 65
End: 2025-07-30
Payer: COMMERCIAL

## 2025-08-01 ENCOUNTER — PATIENT OUTREACH (OUTPATIENT)
Dept: PRIMARY CARE | Facility: CLINIC | Age: 65
End: 2025-08-01
Payer: COMMERCIAL

## 2025-08-01 NOTE — PROGRESS NOTES
Confirmation of at least 2 patient identifiers.    Completed telephonic follow-up with patient after recent visit with DR Ma  Spoke to patient during outreach call.    Patient reports feeling: Improved    Patient has questions or concerns about medications: No    Have all prescribed medications been filled? Yes    Patient has necessary resources to manage their care? Yes    Patient has questions or concerns? No    Next care management follow-up approximately within one month.  Care  information provided to patient.

## 2025-08-14 ENCOUNTER — OFFICE VISIT (OUTPATIENT)
Dept: CARDIOLOGY | Facility: CLINIC | Age: 65
End: 2025-08-14
Payer: COMMERCIAL

## 2025-08-14 VITALS
DIASTOLIC BLOOD PRESSURE: 79 MMHG | OXYGEN SATURATION: 98 % | WEIGHT: 143 LBS | SYSTOLIC BLOOD PRESSURE: 128 MMHG | BODY MASS INDEX: 23.08 KG/M2 | HEART RATE: 57 BPM

## 2025-08-14 DIAGNOSIS — I77.3 FIBROMUSCULAR DYSPLASIA OF BOTH CAROTID ARTERIES: Primary | ICD-10-CM

## 2025-08-14 DIAGNOSIS — Z91.041 ALLERGY TO IODINATED CONTRAST: ICD-10-CM

## 2025-08-14 PROCEDURE — 99212 OFFICE O/P EST SF 10 MIN: CPT

## 2025-08-14 PROCEDURE — 1036F TOBACCO NON-USER: CPT | Performed by: INTERNAL MEDICINE

## 2025-08-14 PROCEDURE — 99204 OFFICE O/P NEW MOD 45 MIN: CPT | Performed by: INTERNAL MEDICINE

## 2025-08-14 RX ORDER — PREDNISONE 50 MG/1
50 TABLET ORAL DAILY
Qty: 3 TABLET | Refills: 0 | Status: SHIPPED | OUTPATIENT
Start: 2025-08-14 | End: 2025-08-17

## 2025-08-14 RX ORDER — FAMOTIDINE 20 MG/1
20 TABLET, FILM COATED ORAL ONCE
Qty: 1 TABLET | Refills: 0 | Status: SHIPPED | OUTPATIENT
Start: 2025-08-14 | End: 2025-08-14

## 2025-08-25 DIAGNOSIS — I77.3 FIBROMUSCULAR DYSPLASIA: Primary | ICD-10-CM

## 2025-08-29 LAB
ALBUMIN SERPL-MCNC: 4.5 G/DL (ref 3.6–5.1)
ALP SERPL-CCNC: 48 U/L (ref 37–153)
ALT SERPL-CCNC: 17 U/L (ref 6–29)
ANION GAP SERPL CALCULATED.4IONS-SCNC: 7 MMOL/L (CALC) (ref 7–17)
AST SERPL-CCNC: 16 U/L (ref 10–35)
BILIRUB SERPL-MCNC: 0.3 MG/DL (ref 0.2–1.2)
BUN SERPL-MCNC: 17 MG/DL (ref 7–25)
CALCIUM SERPL-MCNC: 9.6 MG/DL (ref 8.6–10.4)
CHLORIDE SERPL-SCNC: 106 MMOL/L (ref 98–110)
CHOLEST SERPL-MCNC: 277 MG/DL
CHOLEST/HDLC SERPL: 4.2 (CALC)
CO2 SERPL-SCNC: 28 MMOL/L (ref 20–32)
CREAT SERPL-MCNC: 0.59 MG/DL (ref 0.5–1.05)
CREAT SERPL-MCNC: 0.63 MG/DL (ref 0.5–1.05)
EGFRCR SERPLBLD CKD-EPI 2021: 100 ML/MIN/1.73M2
EGFRCR SERPLBLD CKD-EPI 2021: 101 ML/MIN/1.73M2
GLUCOSE SERPL-MCNC: 98 MG/DL (ref 65–99)
HDLC SERPL-MCNC: 66 MG/DL
LDLC SERPL CALC-MCNC: 192 MG/DL (CALC)
NONHDLC SERPL-MCNC: 211 MG/DL (CALC)
POTASSIUM SERPL-SCNC: 4.3 MMOL/L (ref 3.5–5.3)
PROT SERPL-MCNC: 6.7 G/DL (ref 6.1–8.1)
SODIUM SERPL-SCNC: 141 MMOL/L (ref 135–146)
TRIGL SERPL-MCNC: 81 MG/DL

## 2025-09-03 ENCOUNTER — HOSPITAL ENCOUNTER (OUTPATIENT)
Dept: RADIOLOGY | Facility: HOSPITAL | Age: 65
Discharge: HOME | End: 2025-09-03
Payer: COMMERCIAL

## 2025-09-03 DIAGNOSIS — I77.3 FIBROMUSCULAR DYSPLASIA OF BOTH CAROTID ARTERIES: ICD-10-CM

## 2025-09-03 PROCEDURE — 74174 CTA ABD&PLVS W/CONTRAST: CPT | Performed by: STUDENT IN AN ORGANIZED HEALTH CARE EDUCATION/TRAINING PROGRAM

## 2025-09-03 PROCEDURE — 2550000001 HC RX 255 CONTRASTS: Performed by: INTERNAL MEDICINE

## 2025-09-03 PROCEDURE — 71275 CT ANGIOGRAPHY CHEST: CPT | Performed by: STUDENT IN AN ORGANIZED HEALTH CARE EDUCATION/TRAINING PROGRAM

## 2025-09-03 PROCEDURE — 71275 CT ANGIOGRAPHY CHEST: CPT

## 2025-09-03 RX ADMIN — IOHEXOL 90 ML: 350 INJECTION, SOLUTION INTRAVENOUS at 12:38

## 2025-12-17 ENCOUNTER — APPOINTMENT (OUTPATIENT)
Dept: OTOLARYNGOLOGY | Facility: CLINIC | Age: 65
End: 2025-12-17
Payer: COMMERCIAL